# Patient Record
Sex: MALE | Employment: UNEMPLOYED | URBAN - METROPOLITAN AREA
[De-identification: names, ages, dates, MRNs, and addresses within clinical notes are randomized per-mention and may not be internally consistent; named-entity substitution may affect disease eponyms.]

---

## 2017-03-06 ENCOUNTER — OFFICE VISIT (OUTPATIENT)
Dept: INTERNAL MEDICINE CLINIC | Age: 42
End: 2017-03-06

## 2017-03-06 ENCOUNTER — HOSPITAL ENCOUNTER (OUTPATIENT)
Dept: LAB | Age: 42
Discharge: HOME | End: 2017-03-06

## 2017-03-06 VITALS
SYSTOLIC BLOOD PRESSURE: 130 MMHG | RESPIRATION RATE: 18 BRPM | HEIGHT: 71 IN | HEART RATE: 93 BPM | DIASTOLIC BLOOD PRESSURE: 80 MMHG | WEIGHT: 315 LBS | BODY MASS INDEX: 44.1 KG/M2 | OXYGEN SATURATION: 97 % | TEMPERATURE: 97.6 F

## 2017-03-06 DIAGNOSIS — Z23 NEED FOR TDAP VACCINATION: ICD-10-CM

## 2017-03-06 DIAGNOSIS — Z83.49 FAMILY HISTORY OF HYPOTHYROIDISM: ICD-10-CM

## 2017-03-06 DIAGNOSIS — I10 BENIGN HYPERTENSION WITHOUT CHF: Primary | ICD-10-CM

## 2017-03-06 DIAGNOSIS — R56.9 SEIZURE (HCC): ICD-10-CM

## 2017-03-06 DIAGNOSIS — Z13.21 ENCOUNTER FOR VITAMIN DEFICIENCY SCREENING: ICD-10-CM

## 2017-03-06 DIAGNOSIS — Z13.1 SCREENING FOR DIABETES MELLITUS (DM): ICD-10-CM

## 2017-03-06 DIAGNOSIS — F25.0 SCHIZOAFFECTIVE DISORDER, BIPOLAR TYPE (HCC): ICD-10-CM

## 2017-03-06 PROCEDURE — 99001 SPECIMEN HANDLING PT-LAB: CPT | Performed by: INTERNAL MEDICINE

## 2017-03-06 RX ORDER — LOSARTAN POTASSIUM 100 MG/1
100 TABLET ORAL DAILY
COMMUNITY
End: 2017-03-06 | Stop reason: SDUPTHER

## 2017-03-06 RX ORDER — DIAZEPAM 5 MG/1
5 TABLET ORAL 3 TIMES DAILY
COMMUNITY

## 2017-03-06 RX ORDER — ESCITALOPRAM OXALATE 20 MG/1
20 TABLET ORAL DAILY
COMMUNITY
End: 2017-05-03

## 2017-03-06 RX ORDER — TOPIRAMATE 100 MG/1
200 TABLET, FILM COATED ORAL DAILY
COMMUNITY
End: 2019-05-02

## 2017-03-06 RX ORDER — LOSARTAN POTASSIUM 100 MG/1
100 TABLET ORAL DAILY
Qty: 90 TAB | Refills: 3 | Status: SHIPPED | OUTPATIENT
Start: 2017-03-06 | End: 2018-03-16 | Stop reason: SDUPTHER

## 2017-03-06 RX ORDER — FLUPHENAZINE HYDROCHLORIDE 5 MG/1
12 TABLET ORAL 3 TIMES DAILY
COMMUNITY
End: 2019-05-02

## 2017-03-06 NOTE — PROGRESS NOTES
ROOM # 1    Frederick Noonan presents today for   Chief Complaint   Patient presents with    New Patient     establish care    Hypertension       Frederick Noonan preferred language for health care discussion is english/other. Is someone accompanying this pt? no    Is the patient using any DME equipment during OV? no    Depression Screening:  PHQ 2 / 9, over the last two weeks 3/6/2017   Little interest or pleasure in doing things Nearly every day   Feeling down, depressed or hopeless Nearly every day   Total Score PHQ 2 6       Learning Assessment:  Learning Assessment 3/6/2017   PRIMARY LEARNER Patient   HIGHEST LEVEL OF EDUCATION - PRIMARY LEARNER  2 YEARS OF COLLEGE   BARRIERS PRIMARY LEARNER COGNITIVE   CO-LEARNER CAREGIVER No   PRIMARY LANGUAGE ENGLISH   LEARNER PREFERENCE PRIMARY LISTENING     VIDEOS     READING   ANSWERED BY pateint   RELATIONSHIP SELF       Abuse Screening:  No flowsheet data found. Fall Risk  No flowsheet data found. Health Maintenance reviewed and discussed per provider. Yes    Frederick Noonan is due for [employer]. Please order/place referral if appropriate. Advance Directive:  1. Do you have an advance directive in place? Patient Reply: no    2. If not, would you like material regarding how to put one in place? Patient Reply: no    Coordination of Care:  1. Have you been to the ER, urgent care clinic since your last visit? Hospitalized since your last visit? no    2. Have you seen or consulted any other health care providers outside of the [address] [place] [address] since your last visit? Include any pap smears or colon screening.  no

## 2017-03-06 NOTE — MR AVS SNAPSHOT
Visit Information Date & Time Provider Department Dept. Phone Encounter #  
 3/6/2017  2:45 PM Darwin Wright MD [facility] [phone] [id number] Follow-up Instructions Return in about 1 month (around 4/6/2017) for f/u HTN, medicare wellness exam. Upcoming Health Maintenance Date Due DTaP/Tdap/Td series (2 - Td) 3/6/2027 Allergies as of 3/6/2017  Review Complete On: 3/6/2017 By: Kelsie Castañeda MD  
  
 Severity Noted Reaction Type Reactions Prozac [Fluoxetine]  03/06/2017    Other (comments) More suicidal  
 Ritalin [Methylphenidate]  04/12/2010    Nausea and Vomiting, Anxiety Current Immunizations  Reviewed on 3/6/2017 Name Date  
 TD Vaccine 4/12/2004 Tdap  Incomplete Reviewed by Kelsie Castañeda MD on 3/6/2017 at  2:43 PM  
You Were Diagnosed With   
  
 Codes Comments Benign hypertension without CHF    -  Primary ICD-10-CM: I10 
ICD-9-CM: 401.1 Schizoaffective disorder, bipolar type ([facility]     ICD-10-CM: F25.0 ICD-9-CM: 295.70 Encounter for vitamin deficiency screening     ICD-10-CM: Z13.21 ICD-9-CM: V77.99 Need for Tdap vaccination     ICD-10-CM: A93 ICD-9-CM: V06.1 Screening for diabetes mellitus (DM)     ICD-10-CM: Z13.1 ICD-9-CM: V77.1 Family history of hypothyroidism     ICD-10-CM: Z83.49 
ICD-9-CM: V18.19 Seizure ([facility]     ICD-10-CM: R56.9 ICD-9-CM: 780.39 Vitals BP Pulse Temp Resp Height(growth percentile) Weight(growth percentile) 130/80 (BP 1 Location: Left arm, BP Patient Position: Sitting) 93 97.6 °F (36.4 °C) (Oral) 18 5' 11\" (1.803 m) 316 lb (143.3 kg) SpO2 BMI Smoking Status 97% 44.07 kg/m2 Former Smoker Vitals History BMI and BSA Data Body Mass Index Body Surface Area 44.07 kg/m 2 2.68 m 2 Preferred Pharmacy Pharmacy Name Phone [address] [phone] Your Updated Medication List  
  
   
This list is accurate as of: 3/6/17  2:56 PM.  Always use your most recent med list.  
  
  
  
  
 fluPHENAZine 5 mg tablet Commonly known as:  PROLIXIN Take 5 mg by mouth two (2) times a day. LAMICTAL PO Take 100 mg by mouth two (2) times a day. LEXAPRO 20 mg tablet Generic drug:  escitalopram oxalate Take 20 mg by mouth daily. loratadine 10 mg tablet Commonly known as:  [place] Alexandra Take 1 Tab by mouth daily as needed for Allergies. losartan 100 mg tablet Commonly known as:  COZAAR Take 1 Tab by mouth daily. SEROQUEL PO Take 800 mg by mouth nightly. TOPAMAX 100 mg tablet Generic drug:  topiramate Take 100 mg by mouth two (2) times a day. VALIUM 5 mg tablet Generic drug:  diazePAM  
Take 5 mg by mouth three (3) times daily. Prescriptions Sent to Pharmacy Refills  
 losartan (COZAAR) 100 mg tablet 3 Sig: Take 1 Tab by mouth daily. Class: Normal  
 Pharmacy: [address] #: [phone] Route: Oral  
  
We Performed the Following REFERRAL TO NEUROLOGY [AUD69 Custom] Comments:  
 Please evaluate patient for seizures in 1 week. TETANUS, DIPHTHERIA TOXOIDS AND ACELLULAR PERTUSSIS VACCINE (TDAP), IN INDIVIDS. >=7, IM [id number] CPT(R)] Follow-up Instructions Return in about 1 month (around 4/6/2017) for f/u HTN, medicare wellness exam. To-Do List   
 03/06/2017 Lab:  CBC W/O DIFF   
  
 03/06/2017 Lab:  HEMOGLOBIN A1C WITH EAG   
  
 03/06/2017 Lab:  LIPID PANEL   
  
 03/06/2017 Lab:  METABOLIC PANEL, COMPREHENSIVE   
  
 03/06/2017 Lab:  TSH 3RD GENERATION   
  
 03/06/2017 Lab:  URINALYSIS W/ RFLX MICROSCOPIC   
  
 03/06/2017 Lab:  VITAMIN D, 25 HYDROXY Referral Information  Referral ID Referred By Referred To  
  
 [id number] [place], MD   
   [address] [facility] [address] [place] Phone: [phone] Fax: [phone] Visits Status Start Date End Date 1 New Request 3/6/17 3/6/18 If your referral has a status of pending review or denied, additional information will be sent to support the outcome of this decision. Patient Instructions 1) follow-up in 1 month or sooner if worsening symptoms. Introducing [facility]! Yelitza Shepherd introduces BitLit patient portal. Now you can access parts of your medical record, email your doctor's office, and request medication refills online. 1. In your internet browser, go to [email or web address] "[email or web address]/Last Sizet 2. Click on the First Time User? Click Here link in the Sign In box. You will see the New Member Sign Up page. 3. Enter your BitLit Access Code exactly as it appears below. You will not need to use this code after youve completed the sign-up process. If you do not sign up before the expiration date, you must request a new code. · BitLit Access Code: [facility] Expires: 6/4/2017  1:56 PM 
 
4. Enter the last four digits of your Social Security Number (xxxx) and Date of Birth (mm/dd/yyyy) as indicated and click Submit. You will be taken to the next sign-up page. 5. Create a BitLit ID. This will be your BitLit login ID and cannot be changed, so think of one that is secure and easy to remember. 6. Create a BitLit password. You can change your password at any time. 7. Enter your Password Reset Question and Answer. This can be used at a later time if you forget your password. 8. Enter your e-mail address. You will receive e-mail notification when new information is available in [address] 9. Click Sign Up. You can now view and download portions of your medical record. 10. Click the Download Summary menu link to download a portable copy of your medical information.  
 
If you have questions, please visit the Frequently Asked Questions section of the Certify website. Remember, Certify is NOT to be used for urgent needs. For medical emergencies, dial 911. Now available from your iPhone and Android! Please provide this summary of care documentation to your next provider. Your primary care clinician is listed as Darwin Montez. If you have any questions after today's visit, please call [phone].
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
## 2017-03-07 LAB
25(OH)D3+25(OH)D2 SERPL-MCNC: 18.3 NG/ML (ref 30–100)
ALBUMIN SERPL-MCNC: 4.9 G/DL (ref 3.5–5.5)
ALBUMIN/GLOB SERPL: 2 {RATIO} (ref 1.1–2.5)
ALP SERPL-CCNC: 136 IU/L (ref 39–117)
ALT SERPL-CCNC: 58 IU/L (ref 0–44)
APPEARANCE UR: CLEAR
AST SERPL-CCNC: 37 IU/L (ref 0–40)
BILIRUB SERPL-MCNC: 0.5 MG/DL (ref 0–1.2)
BILIRUB UR QL STRIP: NEGATIVE
BUN SERPL-MCNC: 6 MG/DL (ref 6–24)
BUN/CREAT SERPL: 7 (ref 9–20)
CALCIUM SERPL-MCNC: 9.6 MG/DL (ref 8.7–10.2)
CHLORIDE SERPL-SCNC: 99 MMOL/L (ref 96–106)
CHOLEST SERPL-MCNC: 218 MG/DL (ref 100–199)
CO2 SERPL-SCNC: 22 MMOL/L (ref 18–29)
COLOR UR: YELLOW
CREAT SERPL-MCNC: 0.86 MG/DL (ref 0.76–1.27)
ERYTHROCYTE [DISTWIDTH] IN BLOOD BY AUTOMATED COUNT: 14.9 % (ref 12.3–15.4)
EST. AVERAGE GLUCOSE BLD GHB EST-MCNC: 111 MG/DL
GLOBULIN SER CALC-MCNC: 2.4 G/DL (ref 1.5–4.5)
GLUCOSE SERPL-MCNC: 87 MG/DL (ref 65–99)
GLUCOSE UR QL: NEGATIVE
HBA1C MFR BLD: 5.5 % (ref 4.8–5.6)
HCT VFR BLD AUTO: 41.2 % (ref 37.5–51)
HDLC SERPL-MCNC: 33 MG/DL
HGB BLD-MCNC: 13.8 G/DL (ref 12.6–17.7)
HGB UR QL STRIP: NEGATIVE
INTERPRETATION, 910389: NORMAL
KETONES UR QL STRIP: NEGATIVE
LDLC SERPL CALC-MCNC: 139 MG/DL (ref 0–99)
LEUKOCYTE ESTERASE UR QL STRIP: NEGATIVE
MCH RBC QN AUTO: 27.1 PG (ref 26.6–33)
MCHC RBC AUTO-ENTMCNC: 33.5 G/DL (ref 31.5–35.7)
MCV RBC AUTO: 81 FL (ref 79–97)
MICRO URNS: ABNORMAL
NITRITE UR QL STRIP: NEGATIVE
PH UR STRIP: 6.5 [PH] (ref 5–7.5)
PLATELET # BLD AUTO: 372 X10E3/UL (ref 150–379)
POTASSIUM SERPL-SCNC: 4.2 MMOL/L (ref 3.5–5.2)
PROT SERPL-MCNC: 7.3 G/DL (ref 6–8.5)
PROT UR QL STRIP: NEGATIVE
RBC # BLD AUTO: 5.1 X10E6/UL (ref 4.14–5.8)
SODIUM SERPL-SCNC: 143 MMOL/L (ref 134–144)
SP GR UR: <=1.005 (ref 1–1.03)
TRIGL SERPL-MCNC: 230 MG/DL (ref 0–149)
TSH SERPL DL<=0.005 MIU/L-ACNC: 3.06 UIU/ML (ref 0.45–4.5)
UROBILINOGEN UR STRIP-MCNC: 0.2 MG/DL (ref 0.2–1)
VLDLC SERPL CALC-MCNC: 46 MG/DL (ref 5–40)
WBC # BLD AUTO: 6.4 X10E3/UL (ref 3.4–10.8)

## 2017-03-08 ENCOUNTER — TELEPHONE (OUTPATIENT)
Dept: INTERNAL MEDICINE CLINIC | Age: 42
End: 2017-03-08

## 2017-03-08 DIAGNOSIS — J30.9 ALLERGIC RHINITIS, UNSPECIFIED ALLERGIC RHINITIS TRIGGER, UNSPECIFIED RHINITIS SEASONALITY: Primary | ICD-10-CM

## 2017-03-08 DIAGNOSIS — E55.9 VITAMIN D DEFICIENCY: ICD-10-CM

## 2017-03-08 NOTE — TELEPHONE ENCOUNTER
----- Message from Toni Nicholson LPN sent at 0/6/1791  4:17 PM EST -----  Regarding: FW: Prescription Question  Contact: [phone]  Please advise  ----- Message -----     From: Virginia Butler     Sent: 3/7/2017   2:21 PM       To: [facility] Nurse Pool  Subject: Prescription Question                            I forgot to ask you if you could write a prescription for allergy medicine for me . If you could, I would like a 3 to 6 month supply at a time if possible.  Thank you very ahead of time, Tesfaye Lat

## 2017-03-08 NOTE — TELEPHONE ENCOUNTER
See below. Sent electronically:    Requested Prescriptions     Signed Prescriptions Disp Refills    loratadine (CLARITIN) 10 mg tablet 90 Tab 3     Sig: Take 1 Tab by mouth daily.      Authorizing Provider: Mateo Brown

## 2017-03-10 RX ORDER — LORATADINE 10 MG/1
10 TABLET ORAL DAILY
Qty: 90 TAB | Refills: 3 | Status: SHIPPED | OUTPATIENT
Start: 2017-03-10 | End: 2018-04-03 | Stop reason: SDUPTHER

## 2017-03-10 NOTE — TELEPHONE ENCOUNTER
Sent electronically:    Requested Prescriptions     Signed Prescriptions Disp Refills    loratadine (CLARITIN) 10 mg tablet 90 Tab 3     Sig: Take 1 Tab by mouth daily.      Authorizing Provider: Cooper Soto

## 2017-03-13 PROBLEM — E78.5 DYSLIPIDEMIA: Status: ACTIVE | Noted: 2017-03-01

## 2017-03-13 PROBLEM — E55.9 VITAMIN D DEFICIENCY: Status: ACTIVE | Noted: 2017-03-01

## 2017-03-13 RX ORDER — ERGOCALCIFEROL 1.25 MG/1
CAPSULE ORAL
Qty: 4 CAP | Refills: 0 | Status: SHIPPED | OUTPATIENT
Start: 2017-03-13 | End: 2017-05-03 | Stop reason: ALTCHOICE

## 2017-03-13 NOTE — PROGRESS NOTES
Please let pt know that labs were normal except:    1) lipids are high. He needs to work on diet and exercise. Mail low chol diet info to pt. 2) trig up, he needs to start fish oil 1000mg (DHA+EPA=1000mg) 2 po daily. Don't take at the same time as the lexapro. 3) vitamin D low. Will send electronically vitamin D2 50,000 international units  Take one po weekly x 4 weeks #4 no refills. Recheck vitamin D after 4 weeks. Order in [facility].

## 2017-03-14 ENCOUNTER — TELEPHONE (OUTPATIENT)
Dept: INTERNAL MEDICINE CLINIC | Age: 42
End: 2017-03-14

## 2017-03-14 NOTE — TELEPHONE ENCOUNTER
Attempted to contact pt at  number, no answer. Lvm for pt to return call to office at [phone]. Will continue to try to contact pt.

## 2017-03-14 NOTE — TELEPHONE ENCOUNTER
2 pt. Identifiers confirmed. Pt. Notified of below. No other questions/concerns at this time. Will mail low chol diet info.

## 2017-03-14 NOTE — TELEPHONE ENCOUNTER
----- Message from Kelsie Castañeda MD sent at 3/13/2017  1:51 PM EDT -----  Please let pt know that labs were normal except:    1) lipids are high. He needs to work on diet and exercise. Mail low chol diet info to pt. 2) trig up, he needs to start fish oil 1000mg (DHA+EPA=1000mg) 2 po daily. Don't take at the same time as the lexapro. 3) vitamin D low. Will send electronically vitamin D2 50,000 international units  Take one po weekly x 4 weeks #4 no refills. Recheck vitamin D after 4 weeks. Order in [facility].

## 2017-04-13 DIAGNOSIS — E55.9 VITAMIN D DEFICIENCY: ICD-10-CM

## 2017-05-03 ENCOUNTER — HOSPITAL ENCOUNTER (OUTPATIENT)
Dept: LAB | Age: 42
Discharge: HOME | End: 2017-05-03

## 2017-05-03 ENCOUNTER — OFFICE VISIT (OUTPATIENT)
Dept: INTERNAL MEDICINE CLINIC | Age: 42
End: 2017-05-03

## 2017-05-03 VITALS
BODY MASS INDEX: 44.1 KG/M2 | WEIGHT: 315 LBS | DIASTOLIC BLOOD PRESSURE: 88 MMHG | SYSTOLIC BLOOD PRESSURE: 120 MMHG | RESPIRATION RATE: 16 BRPM | OXYGEN SATURATION: 97 % | HEART RATE: 96 BPM | TEMPERATURE: 96.8 F | HEIGHT: 71 IN

## 2017-05-03 DIAGNOSIS — I10 BENIGN HYPERTENSION WITHOUT CHF: Primary | ICD-10-CM

## 2017-05-03 DIAGNOSIS — E55.9 VITAMIN D DEFICIENCY: ICD-10-CM

## 2017-05-03 DIAGNOSIS — E78.5 DYSLIPIDEMIA: ICD-10-CM

## 2017-05-03 PROCEDURE — 99001 SPECIMEN HANDLING PT-LAB: CPT | Performed by: INTERNAL MEDICINE

## 2017-05-03 NOTE — PROGRESS NOTES
ROOM # 1    Bethany Green presents today for   Chief Complaint   Patient presents with    Hypertension     f/u       Bethany Green preferred language for health care discussion is english/other. Is someone accompanying this pt? no    Is the patient using any DME equipment during OV? no    Depression Screening:  PHQ over the last two weeks 3/6/2017   PHQ Not Done Active Diagnosis of Depression or Bipolar Disorder   Little interest or pleasure in doing things Nearly every day   Feeling down, depressed or hopeless Nearly every day   Total Score PHQ 2 6       Learning Assessment:  Learning Assessment 3/6/2017   PRIMARY LEARNER Patient   HIGHEST LEVEL OF EDUCATION - PRIMARY LEARNER  2 YEARS OF COLLEGE   BARRIERS PRIMARY LEARNER COGNITIVE   CO-LEARNER CAREGIVER No   PRIMARY LANGUAGE ENGLISH   LEARNER PREFERENCE PRIMARY LISTENING     VIDEOS     READING   ANSWERED BY pateint   RELATIONSHIP SELF       Abuse Screening:  No flowsheet data found. Fall Risk  No flowsheet data found. Health Maintenance reviewed and discussed per provider. Yes      Advance Directive:  1. Do you have an advance directive in place? Patient Reply: no    2. If not, would you like material regarding how to put one in place? Patient Reply: no    Coordination of Care:  1. Have you been to the ER, urgent care clinic since your last visit? Hospitalized since your last visit? no    2. Have you seen or consulted any other health care providers outside of the [address] [place] [address] since your last visit? Include any pap smears or colon screening.  psych

## 2017-05-03 NOTE — MR AVS SNAPSHOT
Visit Information Date & Time Provider Department Dept. Phone Encounter #  
 5/3/2017 10:30 AM Gabriela Mike MD [address] [phone] [id number] Follow-up Instructions Return in about 3 months (around 8/3/2017) for f/u HTN/lipids. Upcoming Health Maintenance Date Due INFLUENZA AGE 9 TO ADULT 8/1/2017 DTaP/Tdap/Td series (2 - Td) 3/6/2027 Allergies as of 5/3/2017  Review Complete On: 5/3/2017 By: Gabriela Mike MD  
  
 Severity Noted Reaction Type Reactions Prozac [Fluoxetine]  03/06/2017    Other (comments) More suicidal  
 Ritalin [Methylphenidate]  04/12/2010    Nausea and Vomiting, Anxiety Current Immunizations  Reviewed on 3/6/2017 Name Date  
 TD Vaccine 4/12/2004 Tdap 3/6/2017 Not reviewed this visit You Were Diagnosed With   
  
 Codes Comments Benign hypertension without CHF    -  Primary ICD-10-CM: I10 
ICD-9-CM: 401.1 Dyslipidemia     ICD-10-CM: E78.5 ICD-9-CM: 272.4 Vitamin D deficiency     ICD-10-CM: E55.9 ICD-9-CM: 268.9 Vitals BP Pulse Temp Resp Height(growth percentile) Weight(growth percentile) 120/88 (BP 1 Location: Left arm, BP Patient Position: Sitting) 96 96.8 °F (36 °C) (Oral) 16 5' 11\" (1.803 m) 316 lb 9.6 oz (143.6 kg) SpO2 BMI Smoking Status 97% 44.16 kg/m2 Former Smoker Vitals History BMI and BSA Data Body Mass Index Body Surface Area  
 44.16 kg/m 2 2.68 m 2 [facility] [phone] Your Updated Medication List  
  
   
This list is accurate as of: 5/3/17 10:50 AM.  Always use your most recent med list.  
  
  
  
  
 fluPHENAZine 5 mg tablet Commonly known as:  PROLIXIN Take 5 mg by mouth two (2) times a day. LAMICTAL PO Take 100 mg by mouth two (2) times a day. loratadine 10 mg tablet Commonly known as: [place] Bryson Take 1 Tab by mouth daily. losartan 100 mg tablet Commonly known as:  COZAAR Take 1 Tab by mouth daily. OTHER Fish oil 2400mg a daily liquid SEROQUEL PO Take 800 mg by mouth nightly. TOPAMAX 100 mg tablet Generic drug:  topiramate Take 100 mg by mouth two (2) times a day. VALIUM 5 mg tablet Generic drug:  diazePAM  
Take 10 mg by mouth three (3) times daily. Follow-up Instructions Return in about 3 months (around 8/3/2017) for f/u HTN/lipids. To-Do List   
 05/03/2017 Lab:  VITAMIN D, 25 HYDROXY Patient Instructions 1) follow-up in 3 months or sooner if worsening symptoms. Introducing [facility]! Dear Milind Forte: 
Thank you for requesting a Roov[employer] account. Our records indicate that you already have an active Roovyn account. You can access your account anytime at [email or web address] Did you know that you can access your hospital and ER discharge instructions at any time in Roovyn? You can also review all of your test results from your hospital stay or ER visit. Additional Information If you have questions, please visit the Frequently Asked Questions section of the Roovyn website at [email or web address] Remember, Roovyn is NOT to be used for urgent needs. For medical emergencies, dial 911. Now available from your iPhone and Android! Please provide this summary of care documentation to your next provider. Your primary care clinician is listed as Darwin Montez. If you have any questions after today's visit, please call [phone].
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
## 2017-05-04 LAB — 25(OH)D3+25(OH)D2 SERPL-MCNC: 22.3 NG/ML (ref 30–100)

## 2017-05-11 DIAGNOSIS — J30.9 ALLERGIC RHINITIS, UNSPECIFIED ALLERGIC RHINITIS TRIGGER, UNSPECIFIED RHINITIS SEASONALITY: ICD-10-CM

## 2017-05-11 RX ORDER — LORATADINE 10 MG/1
10 TABLET ORAL DAILY
Qty: 90 TAB | Refills: 3 | OUTPATIENT
Start: 2017-05-11

## 2017-05-11 NOTE — TELEPHONE ENCOUNTER
From: Wilner Crawley  To: Viet Putnam MD  Sent: 5/11/2017 7:05 AM EDT  Subject: Medication Renewal Request    Original authorizing provider: MD Wilner Baum would like a refill of the following medications:  loratadine (CLARITIN) 10 mg tablet Viet Putnam MD]    Preferred pharmacy: [place] [id number]    Comment:

## 2017-05-11 NOTE — TELEPHONE ENCOUNTER
Spoke with patient verified with two identifiers and he stated he called the pharmacy for his refill and he stated that they were giving him only a 30 day supply so he stated that they told him to bring up his old bottles and and they are going to fix it for the 90 day with refills. Advised patient that if they do not fix it then to send a request again via my chart with that not that they did not fix his refills from 3/2017. Patient verbalized understanding.

## 2017-06-07 ENCOUNTER — OFFICE VISIT (OUTPATIENT)
Dept: INTERNAL MEDICINE CLINIC | Age: 42
End: 2017-06-07

## 2017-06-07 VITALS
TEMPERATURE: 96.3 F | DIASTOLIC BLOOD PRESSURE: 80 MMHG | SYSTOLIC BLOOD PRESSURE: 140 MMHG | HEART RATE: 90 BPM | HEIGHT: 71 IN | RESPIRATION RATE: 18 BRPM | BODY MASS INDEX: 44.1 KG/M2 | WEIGHT: 315 LBS | OXYGEN SATURATION: 96 %

## 2017-06-07 DIAGNOSIS — R13.10 DYSPHAGIA, UNSPECIFIED TYPE: Primary | ICD-10-CM
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
## 2017-06-07 NOTE — PROGRESS NOTES
ROOM # Sandy Vásquez 86 presents today for   Chief Complaint   Patient presents with    Vomiting     not nauseous, but vomits when food touches throat.  Dysphagia     food stuck in throat       Xavi Brizuela preferred language for health care discussion is english/other. Is someone accompanying this pt? no    Is the patient using any DME equipment during OV? no    Depression Screening:  PHQ over the last two weeks 3/6/2017   PHQ Not Done Active Diagnosis of Depression or Bipolar Disorder   Little interest or pleasure in doing things Nearly every day   Feeling down, depressed or hopeless Nearly every day   Total Score PHQ 2 6       Learning Assessment:  Learning Assessment 3/6/2017   PRIMARY LEARNER Patient   HIGHEST LEVEL OF EDUCATION - PRIMARY LEARNER  2 YEARS OF COLLEGE   BARRIERS PRIMARY LEARNER COGNITIVE   CO-LEARNER CAREGIVER No   PRIMARY LANGUAGE ENGLISH   LEARNER PREFERENCE PRIMARY LISTENING     VIDEOS     READING   ANSWERED BY pateint   RELATIONSHIP SELF       Abuse Screening:  No flowsheet data found. Fall Risk  No flowsheet data found. Health Maintenance reviewed and discussed per provider. Yes      Advance Directive:  1. Do you have an advance directive in place? Patient Reply: no    2. If not, would you like material regarding how to put one in place? Patient Reply: no    Coordination of Care:  1. Have you been to the ER, urgent care clinic since your last visit? Hospitalized since your last visit? no    2. Have you seen or consulted any other health care providers outside of the [address] [place] [address] since your last visit? Include any pap smears or colon screening.  no

## 2017-06-07 NOTE — MR AVS SNAPSHOT
Visit Information Date & Time Provider Department Dept. Phone Encounter #  
 6/7/2017  9:15 AM Jazmin Flores MD [facility] [phone] [id number] Follow-up Instructions Return in about 8 weeks (around 8/3/2017) for pt already has this appt. scheduled. .  
  
Your Appointments 8/3/2017 10:30 AM  
Office Visit with Jr Ernst MD  
[facility]) Appt Note: 3 month f/u HTN/Lipids; MWV and f/u htn and chol  
 [address]  
  
   
 [address] [facility] Upcoming Health Maintenance Date Due INFLUENZA AGE 9 TO ADULT 8/1/2017 DTaP/Tdap/Td series (2 - Td) 3/6/2027 Allergies as of 6/7/2017  Review Complete On: 6/7/2017 By: Jr Ernst MD  
  
 Severity Noted Reaction Type Reactions Prozac [Fluoxetine]  03/06/2017    Other (comments) More suicidal  
 Ritalin [Methylphenidate]  04/12/2010    Nausea and Vomiting, Anxiety Current Immunizations  Reviewed on 3/6/2017 Name Date  
 TD Vaccine 4/12/2004 Tdap 3/6/2017 Not reviewed this visit You Were Diagnosed With   
  
 Codes Comments Dysphagia, unspecified type    -  Primary ICD-10-CM: R13.10 ICD-9-CM: 787.20 Vitals BP Pulse Temp Resp Height(growth percentile) Weight(growth percentile) 140/80 (BP 1 Location: Left arm, BP Patient Position: Sitting) 90 96.3 °F (35.7 °C) (Oral) 18 5' 11\" (1.803 m) 320 lb 9.6 oz (145.4 kg) SpO2 BMI Smoking Status 96% 44.71 kg/m2 Former Smoker Vitals History BMI and BSA Data Body Mass Index Body Surface Area 44.71 kg/m 2 2.7 m 2 Preferred Pharmacy Pharmacy Name Phone [facility] [phone] Your Updated Medication List  
  
   
This list is accurate as of: 6/7/17  9:18 AM.  Always use your most recent med list.  
  
  
  
  
 fluPHENAZine 5 mg tablet Commonly known as:  PROLIXIN Take 5 mg by mouth two (2) times a day. LAMICTAL PO Take 100 mg by mouth two (2) times a day. loratadine 10 mg tablet Commonly known as:  Brittani Pound Take 1 Tab by mouth daily. losartan 100 mg tablet Commonly known as:  COZAAR Take 1 Tab by mouth daily. OTHER Fish oil 2400mg a daily liquid SEROQUEL PO Take 800 mg by mouth nightly. TOPAMAX 100 mg tablet Generic drug:  topiramate Take 100 mg by mouth two (2) times a day. VALIUM 5 mg tablet Generic drug:  diazePAM  
Take 10 mg by mouth three (3) times daily. VITAMIN D3 PO Take 200 Units by mouth daily. We Performed the Following REFERRAL TO GASTROENTEROLOGY [IZT04 Custom] Comments:  
 Please evaluate patient for dysphagia ASAP. Follow-up Instructions Return in about 8 weeks (around 8/3/2017) for pt already has this appt. scheduled. .  
  
  
Referral Information Referral ID Referred By Referred To  
  
 [id number] Susi Carrillo MD   
   [address] [place] [address] Phone: [phone] Fax: [phone] Visits Status Start Date End Date 1 New Request 6/7/17 6/7/18 If your referral has a status of pending review or denied, additional information will be sent to support the outcome of this decision. Patient Instructions 1) follow-up on 8/3/17 or sooner if worsening symptoms. Introducing [facility]! Dear Justin Chappell: 
Thank you for requesting a [employer] account. Our records indicate that you already have an active [employer] account. You can access your account anytime at [email or web address] Did you know that you can access your hospital and ER discharge instructions at any time in [employer]? You can also review all of your test results from your hospital stay or ER visit. Additional Information If you have questions, please visit the Frequently Asked Questions section of the [email or web address] website at [email or web address] Remember, [employer] is NOT to be used for urgent needs. For medical emergencies, dial 911. Now available from your iPhone and Android! Please provide this summary of care documentation to your next provider. Your primary care clinician is listed as Darwin Montez. If you have any questions after today's visit, please call [phone].

## 2017-10-04 ENCOUNTER — TELEPHONE (OUTPATIENT)
Dept: INTERNAL MEDICINE CLINIC | Age: 42
End: 2017-10-04

## 2017-10-04 NOTE — LETTER
10/5/2017 6:59 AM 
 
Mr. [address] [facility] [id number] To Whom It May Concern: This is a letter stating that Mr. Tim Rodas, : 1975. I am his primary care physician and he requires a blood pressure monitor for his hypertension. If you have any questions, please have the patient call us at [phone]. Sincerely, Alex Heath M.D.

## 2017-10-04 NOTE — TELEPHONE ENCOUNTER
Patient called and said he needs a letter stating the need for a blood pressure machine. He spoke with his insurance company and he said they would need a letter stating this. The fax number is [phone] per patient. Patient's new number is [phone].

## 2018-01-29 ENCOUNTER — OFFICE VISIT (OUTPATIENT)
Dept: INTERNAL MEDICINE CLINIC | Age: 43
End: 2018-01-29

## 2018-01-29 ENCOUNTER — HOSPITAL ENCOUNTER (OUTPATIENT)
Dept: LAB | Age: 43
Discharge: HOME | End: 2018-01-29

## 2018-01-29 VITALS
OXYGEN SATURATION: 95 % | SYSTOLIC BLOOD PRESSURE: 129 MMHG | TEMPERATURE: 98.3 F | HEART RATE: 77 BPM | DIASTOLIC BLOOD PRESSURE: 93 MMHG | WEIGHT: 298 LBS | BODY MASS INDEX: 41.72 KG/M2 | RESPIRATION RATE: 18 BRPM | HEIGHT: 71 IN

## 2018-01-29 DIAGNOSIS — E66.01 MORBID OBESITY WITH BMI OF 40.0-44.9, ADULT (HCC): Chronic | ICD-10-CM

## 2018-01-29 DIAGNOSIS — I10 ESSENTIAL HYPERTENSION: Chronic | ICD-10-CM

## 2018-01-29 DIAGNOSIS — E78.5 DYSLIPIDEMIA: Primary | ICD-10-CM

## 2018-01-29 DIAGNOSIS — E55.9 VITAMIN D DEFICIENCY: ICD-10-CM

## 2018-01-29 PROCEDURE — 99001 SPECIMEN HANDLING PT-LAB: CPT | Performed by: FAMILY MEDICINE
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
## 2018-01-29 NOTE — PROGRESS NOTES
Kimani Acosta is a 40 y.o. male  Chief Complaint   Patient presents with    Physical     1. Have you been to the ER, urgent care clinic since your last visit? Hospitalized since your last visit? No    2. Have you seen or consulted any other health care providers outside of the [address] [place] [address] since your last visit? Include any pap smears or colon screening.  No

## 2018-01-29 NOTE — MR AVS SNAPSHOT
[address] [place] [address] 
 
 
 [address] [facility] [id number] 
[phone] Patient: Tito Orr MRN: [id number] :1975 Visit Information Date & Time Provider Department Dept. Phone Encounter #  
 2018 11:15 AM Lawanda GuerreroReedsy [phone] [id number] Upcoming Health Maintenance Date Due Influenza Age 5 to Adult 2017 DTaP/Tdap/Td series (2 - Td) 3/6/2027 Allergies as of 2018  Review Complete On: 2018 By: Opal Cuadra LPN Severity Noted Reaction Type Reactions Prozac [Fluoxetine]  2017    Other (comments) More suicidal  
 Ritalin [Methylphenidate]  2010    Nausea and Vomiting, Anxiety Current Immunizations  Reviewed on 3/6/2017 Name Date  
 TD Vaccine 2004 Tdap 3/6/2017 Not reviewed this visit You Were Diagnosed With   
  
 Codes Comments Dyslipidemia    -  Primary ICD-10-CM: E78.5 ICD-9-CM: 272.4 Vitamin D deficiency     ICD-10-CM: E55.9 ICD-9-CM: 268.9 Morbid obesity with BMI of 40.0-44.9, adult (HCC)     ICD-10-CM: E66.01, Z68.41 
ICD-9-CM: 278.01, V85.41 Essential hypertension     ICD-10-CM: I10 
ICD-9-CM: 401.9 Vitals BP Pulse Temp Resp Height(growth percentile) Weight(growth percentile) (!) 129/93 (BP 1 Location: Right arm, BP Patient Position: Sitting) 77 98.3 °F (36.8 °C) (Oral) 18 5' 11\" (1.803 m) 298 lb (135.2 kg) SpO2 BMI Smoking Status 95% 41.56 kg/m2 Former Smoker Vitals History BMI and BSA Data Body Mass Index Body Surface Area 41.56 kg/m 2 2.6 m 2 Preferred Pharmacy Pharmacy Name Phone [facility] [phone] Your Updated Medication List  
  
   
This list is accurate as of: 18 12:19 PM.  Always use your most recent med list.  
  
  
  
  
 fluPHENAZine 5 mg tablet Commonly known as:  PROLIXIN Take 5 mg by mouth two (2) times a day. LAMICTAL PO Take 100 mg by mouth two (2) times a day. loratadine 10 mg tablet Commonly known as:  Love Rosales Take 1 Tab by mouth daily. losartan 100 mg tablet Commonly known as:  COZAAR Take 1 Tab by mouth daily. OTHER Fish oil 2400mg a daily liquid SEROQUEL PO Take 800 mg by mouth nightly. TOPAMAX 100 mg tablet Generic drug:  topiramate Take 100 mg by mouth two (2) times a day. VALIUM 5 mg tablet Generic drug:  diazePAM  
Take 10 mg by mouth three (3) times daily. VITAMIN D3 PO Take 200 Units by mouth daily. To-Do List   
 01/29/2018 Lab:  CBC WITH AUTOMATED DIFF   
  
 01/29/2018 Lab:  HEMOGLOBIN A1C WITH EAG   
  
 01/29/2018 Lab:  LDL, DIRECT   
  
 01/29/2018 Lab:  METABOLIC PANEL, COMPREHENSIVE   
  
 01/29/2018 Lab:  TSH 3RD GENERATION   
  
 01/29/2018 Lab:  VITAMIN D, 1, 25 DIHYDROXY Introducing [facility] SERVICES! Dear Zulema Hernandez: 
Thank you for requesting a [employer] account. Our records indicate that you already have an active [employer] account. You can access your account anytime at [email or web address] Did you know that you can access your hospital and ER discharge instructions at any time in [employer]? You can also review all of your test results from your hospital stay or ER visit. Additional Information If you have questions, please visit the Frequently Asked Questions section of the [employer] website at [email or web address] Remember, [employer] is NOT to be used for urgent needs. For medical emergencies, dial 911. Now available from your iPhone and Android! Please provide this summary of care documentation to your next provider. Your primary care clinician is listed as Darwin Montez. If you have any questions after today's visit, please call [phone].

## 2018-01-30 LAB
1,25(OH)2D3 SERPL-MCNC: 64.7 PG/ML (ref 19.9–79.3)
ALBUMIN SERPL-MCNC: 4.7 G/DL (ref 3.5–5.5)
ALBUMIN/GLOB SERPL: 1.7 {RATIO} (ref 1.2–2.2)
ALP SERPL-CCNC: 119 IU/L (ref 39–117)
ALT SERPL-CCNC: 41 IU/L (ref 0–44)
AST SERPL-CCNC: 28 IU/L (ref 0–40)
BASOPHILS # BLD AUTO: 0.1 X10E3/UL (ref 0–0.2)
BASOPHILS NFR BLD AUTO: 1 %
BILIRUB SERPL-MCNC: 0.9 MG/DL (ref 0–1.2)
BUN SERPL-MCNC: 8 MG/DL (ref 6–24)
BUN/CREAT SERPL: 9 (ref 9–20)
CALCIUM SERPL-MCNC: 9.7 MG/DL (ref 8.7–10.2)
CHLORIDE SERPL-SCNC: 98 MMOL/L (ref 96–106)
CO2 SERPL-SCNC: 23 MMOL/L (ref 18–29)
CREAT SERPL-MCNC: 0.89 MG/DL (ref 0.76–1.27)
EOSINOPHIL # BLD AUTO: 0.2 X10E3/UL (ref 0–0.4)
EOSINOPHIL NFR BLD AUTO: 2 %
ERYTHROCYTE [DISTWIDTH] IN BLOOD BY AUTOMATED COUNT: 13.3 % (ref 12.3–15.4)
EST. AVERAGE GLUCOSE BLD GHB EST-MCNC: 103 MG/DL
GFR SERPLBLD CREATININE-BSD FMLA CKD-EPI: 106 ML/MIN/1.73
GFR SERPLBLD CREATININE-BSD FMLA CKD-EPI: 123 ML/MIN/1.73
GLOBULIN SER CALC-MCNC: 2.7 G/DL (ref 1.5–4.5)
GLUCOSE SERPL-MCNC: 92 MG/DL (ref 65–99)
HBA1C MFR BLD: 5.2 % (ref 4.8–5.6)
HCT VFR BLD AUTO: 42.4 % (ref 37.5–51)
HGB BLD-MCNC: 14.8 G/DL (ref 13–17.7)
IMM GRANULOCYTES # BLD: 0 X10E3/UL (ref 0–0.1)
IMM GRANULOCYTES NFR BLD: 0 %
LDLC SERPL DIRECT ASSAY-MCNC: 152 MG/DL (ref 0–99)
LYMPHOCYTES # BLD AUTO: 1.6 X10E3/UL (ref 0.7–3.1)
LYMPHOCYTES NFR BLD AUTO: 21 %
MCH RBC QN AUTO: 29.3 PG (ref 26.6–33)
MCHC RBC AUTO-ENTMCNC: 34.9 G/DL (ref 31.5–35.7)
MCV RBC AUTO: 84 FL (ref 79–97)
MONOCYTES # BLD AUTO: 0.4 X10E3/UL (ref 0.1–0.9)
MONOCYTES NFR BLD AUTO: 6 %
NEUTROPHILS # BLD AUTO: 5.4 X10E3/UL (ref 1.4–7)
NEUTROPHILS NFR BLD AUTO: 70 %
PLATELET # BLD AUTO: 362 X10E3/UL (ref 150–379)
POTASSIUM SERPL-SCNC: 4.3 MMOL/L (ref 3.5–5.2)
PROT SERPL-MCNC: 7.4 G/DL (ref 6–8.5)
RBC # BLD AUTO: 5.05 X10E6/UL (ref 4.14–5.8)
SODIUM SERPL-SCNC: 138 MMOL/L (ref 134–144)
TSH SERPL DL<=0.005 MIU/L-ACNC: 3.19 UIU/ML (ref 0.45–4.5)
WBC # BLD AUTO: 7.7 X10E3/UL (ref 3.4–10.8)

## 2018-02-02 ENCOUNTER — TELEPHONE (OUTPATIENT)
Dept: INTERNAL MEDICINE CLINIC | Age: 43
End: 2018-02-02

## 2018-02-02 NOTE — TELEPHONE ENCOUNTER
----- Message from Christina Robertson sent at 2/2/2018  7:51 AM EST -----  Regarding: FW: Test Results Question  Contact: [phone]      ----- Message -----     From: Jose Coleman     Sent: 2/2/2018   7:21 AM       To: [facility] Nurse Pool  Subject: Test Results Question                            I would like to know if there is anything I neeed to be concerned with over all my test results. I would also like if you could refer me for a neurologist for my ticks. Preferably at [facility] if possible. I also need to know when would be a good time to set up and see you again.

## 2018-02-02 NOTE — TELEPHONE ENCOUNTER
I did not order the labs. It looks like it was Dr. Louis Carmona. However, labs were okay except LDL was high at 152. Can we have the lab add on lipid panel? Additional dx code: E78.5. Depending on the further lipid panel results, will determine your next OV appt. What type of ticks is he having? Did he ever see Dr. Mauri Acevedo for his seizures last year? I referred him on  3/6/17.

## 2018-02-07 ENCOUNTER — TELEPHONE (OUTPATIENT)
Dept: INTERNAL MEDICINE CLINIC | Age: 43
End: 2018-02-07

## 2018-02-07 NOTE — TELEPHONE ENCOUNTER
Unless he his having symptoms, not going to do EKG. If he is having symptoms, then he needs to be seen.

## 2018-02-07 NOTE — TELEPHONE ENCOUNTER
Patient called to request EKG. Patient did not relay his concerns as to why he felt he needed testing.

## 2018-02-08 NOTE — TELEPHONE ENCOUNTER
2 pt. Identifiers confirmed. Pt. Notified of below. Per pt. He has some numbness in left arm for 1 month and chest hurts a little sometimes. He was advised to be seen in the ER if having s/s now. He states it takes 3 days for cab to provide transportation d/t insurance. Pt. Will s/w his mother to decide whether to go to the ED tonight or to come in to the clinic to be seen tomorrow. No other questions/concerns at this time.

## 2018-03-24 DIAGNOSIS — I10 BENIGN HYPERTENSION WITHOUT CHF: ICD-10-CM

## 2018-03-25 RX ORDER — LOSARTAN POTASSIUM 100 MG/1
TABLET ORAL
Qty: 90 TAB | Refills: 0 | Status: SHIPPED | OUTPATIENT
Start: 2018-03-25 | End: 2018-08-20 | Stop reason: SDUPTHER

## 2018-03-29 ENCOUNTER — TELEPHONE (OUTPATIENT)
Dept: INTERNAL MEDICINE CLINIC | Age: 43
End: 2018-03-29

## 2018-03-29 DIAGNOSIS — Z86.59 H/O TICS: ICD-10-CM

## 2018-03-29 DIAGNOSIS — R56.9 SEIZURE (HCC): Primary | ICD-10-CM

## 2018-03-29 NOTE — TELEPHONE ENCOUNTER
Referral generated for  Dr. Angel Liu. Contact information below. Please have pt call them if does not have an appt. Within 1 week.      [address] [place], [address] [facility]  Phone: [phone]

## 2018-03-29 NOTE — TELEPHONE ENCOUNTER
Patient requesting referral for Neurology for seizures and facial tic. Patient requests specialist be in the area of [facility].

## 2018-04-02 ENCOUNTER — TELEPHONE (OUTPATIENT)
Dept: INTERNAL MEDICINE CLINIC | Age: 43
End: 2018-04-02

## 2018-04-02 DIAGNOSIS — I10 BENIGN HYPERTENSION WITHOUT CHF: ICD-10-CM

## 2018-04-02 DIAGNOSIS — E78.5 DYSLIPIDEMIA: Primary | ICD-10-CM

## 2018-04-02 DIAGNOSIS — E55.9 VITAMIN D DEFICIENCY: ICD-10-CM

## 2018-04-02 NOTE — TELEPHONE ENCOUNTER
2 pt. Identifiers confirmed. Pt. Notified that records have been sent in regards to below. Confirmation received. Pt. Also scheduled for chol f/u 4/10/18 and he requests that labs be entered so that he may complete them prior to aptmt. No other questions/concerns at this time.

## 2018-04-02 NOTE — TELEPHONE ENCOUNTER
Patient states he contact Neurology to make appointment. Dr Christopher Pruitt office requires Demographics, most recent lab and office visits prior to scheduling.

## 2018-04-03 RX ORDER — LORATADINE 10 MG/1
10 TABLET ORAL DAILY
Qty: 90 TAB | Refills: 3 | Status: SHIPPED | OUTPATIENT
Start: 2018-04-03 | End: 2019-05-04 | Stop reason: SDUPTHER

## 2018-04-03 NOTE — TELEPHONE ENCOUNTER
Requested Prescriptions     Pending Prescriptions Disp Refills    loratadine (CLARITIN) 10 mg tablet 90 Tab 3     Sig: Take 1 Tab by mouth daily.

## 2018-05-02 DIAGNOSIS — E78.5 DYSLIPIDEMIA: ICD-10-CM

## 2018-05-02 DIAGNOSIS — E55.9 VITAMIN D DEFICIENCY: ICD-10-CM

## 2018-06-27 ENCOUNTER — OFFICE VISIT (OUTPATIENT)
Dept: INTERNAL MEDICINE CLINIC | Age: 43
End: 2018-06-27

## 2018-06-27 ENCOUNTER — TELEPHONE (OUTPATIENT)
Dept: INTERNAL MEDICINE CLINIC | Age: 43
End: 2018-06-27

## 2018-06-27 ENCOUNTER — HOSPITAL ENCOUNTER (OUTPATIENT)
Dept: LAB | Age: 43
Discharge: HOME | End: 2018-06-27

## 2018-06-27 VITALS
HEIGHT: 71 IN | DIASTOLIC BLOOD PRESSURE: 80 MMHG | TEMPERATURE: 96.7 F | HEART RATE: 86 BPM | WEIGHT: 315 LBS | SYSTOLIC BLOOD PRESSURE: 132 MMHG | RESPIRATION RATE: 16 BRPM | OXYGEN SATURATION: 96 % | BODY MASS INDEX: 44.1 KG/M2

## 2018-06-27 DIAGNOSIS — G89.29 CHRONIC BILATERAL LOW BACK PAIN WITHOUT SCIATICA: ICD-10-CM

## 2018-06-27 DIAGNOSIS — E66.01 MORBID OBESITY WITH BMI OF 40.0-44.9, ADULT (HCC): Chronic | ICD-10-CM

## 2018-06-27 DIAGNOSIS — T88.7XXA SIDE EFFECT OF MEDICATION: ICD-10-CM

## 2018-06-27 DIAGNOSIS — I10 BENIGN HYPERTENSION WITHOUT CHF: ICD-10-CM

## 2018-06-27 DIAGNOSIS — E55.9 VITAMIN D DEFICIENCY: ICD-10-CM

## 2018-06-27 DIAGNOSIS — M54.50 CHRONIC BILATERAL LOW BACK PAIN WITHOUT SCIATICA: ICD-10-CM

## 2018-06-27 DIAGNOSIS — E78.5 DYSLIPIDEMIA: ICD-10-CM

## 2018-06-27 DIAGNOSIS — Z00.00 ENCOUNTER FOR MEDICARE ANNUAL WELLNESS EXAM: Primary | ICD-10-CM

## 2018-06-27 DIAGNOSIS — E66.9 OBESITY WITHOUT SERIOUS COMORBIDITY, UNSPECIFIED CLASSIFICATION, UNSPECIFIED OBESITY TYPE: ICD-10-CM

## 2018-06-27 DIAGNOSIS — R11.2 NAUSEA AND VOMITING, INTRACTABILITY OF VOMITING NOT SPECIFIED, UNSPECIFIED VOMITING TYPE: ICD-10-CM

## 2018-06-27 PROBLEM — F32.1 MODERATE MAJOR DEPRESSION (HCC): Status: ACTIVE | Noted: 2018-06-27

## 2018-06-27 PROCEDURE — 99001 SPECIMEN HANDLING PT-LAB: CPT | Performed by: INTERNAL MEDICINE

## 2018-06-27 RX ORDER — GABAPENTIN 600 MG/1
600 TABLET ORAL
Refills: 0 | COMMUNITY
Start: 2018-06-12 | End: 2019-05-02

## 2018-06-27 RX ORDER — ONDANSETRON 4 MG/1
4 TABLET, ORALLY DISINTEGRATING ORAL
Qty: 30 TAB | Refills: 0 | Status: SHIPPED | OUTPATIENT
Start: 2018-06-27 | End: 2018-07-12 | Stop reason: SDUPTHER

## 2018-06-27 RX ORDER — PERPHENAZINE 8 MG/1
16 TABLET, FILM COATED ORAL 3 TIMES DAILY
Refills: 0 | COMMUNITY
Start: 2018-06-08 | End: 2019-05-02

## 2018-06-27 RX ORDER — MELOXICAM 7.5 MG/1
TABLET ORAL
Qty: 60 TAB | Refills: 0 | Status: SHIPPED | OUTPATIENT
Start: 2018-06-27 | End: 2018-07-23 | Stop reason: SDUPTHER

## 2018-06-27 RX ORDER — ESCITALOPRAM OXALATE 20 MG/1
20 TABLET ORAL DAILY
Refills: 0 | COMMUNITY
Start: 2018-06-07 | End: 2019-05-02

## 2018-06-27 RX ORDER — LANOLIN ALCOHOL/MO/W.PET/CERES
1000 CREAM (GRAM) TOPICAL DAILY
COMMUNITY
End: 2019-06-11

## 2018-06-27 NOTE — PATIENT INSTRUCTIONS
Schedule of Personalized Health Plan  (Provide Copy to Patient)  The best way to stay healthy is to live a healthy lifestyle. A healthy lifestyle includes regular exercise, eating a well-balanced diet, keeping a healthy weight and not smoking. Regular physical exams and screening tests are another important way to take care of yourself. Preventive exams provided by health care providers can find health problems early when treatment works best and can keep you from getting certain diseases or illnesses. Preventive services include exams, lab tests, screenings, shots, monitoring and information to help you take care of your own health. All people over 65 should have a pneumonia shot. Pneumonia shots are usually only needed once in a lifetime unless your doctor decides differently. All people over 65 should have a yearly flu shot. People over 65 are at medium to high risk for Hepatitis B. Three shots are needed for complete protection. In addition to your physical exam, some screening tests are recommended:    Bone mass measurement (dexa scan) is recommended every two years if you have certain risk factors, such as personal history of vertebral fracture or chronic steroid medication use    Diabetes Mellitus screening is recommended every year. Glaucoma is an eye disease caused by high pressure in the eye. An eye exam is recommended every year. Cardiovascular screening tests that check your cholesterol and other blood fat (lipid) levels are recommended every five years. Colorectal Cancer screening tests help to find pre-cancerous polyps (growths in the colon) so they can be removed before they turn into cancer. Tests ordered for screening depend on your personal and family history risk factors.     Screening for Prostate Cancer is recommended yearly with a digital rectal exam and/or a PSA test    Here is a list of your current Health Maintenance items with a due date:  Health Maintenance Topic Date Due    MEDICARE YEARLY EXAM  03/14/2018    Influenza Age 5 to Adult  08/01/2018    DTaP/Tdap/Td series (2 - Td) 03/06/2027     1) follow-up in 3 months or sooner if worsening symptoms. 2) don't take zofran while driving or operating heavy machinery. 3) make appt. With GI sooner    4) don't take zofran at the same time as your psychiatric medications. Give 8 hours in between. 5) make sure to be hydrated. 6) Take mobic  With food. If you notice any blood/black tarry stools, diarrhea, abdominal pain, nausea, vomiting then stop medication immediately. Give us a call ASAP. 7) use heating pad, icy/hot, tiger balm for pain. Back Stretches: Exercises  Your Care Instructions  Here are some examples of exercises for stretching your back. Start each exercise slowly. Ease off the exercise if you start to have pain. Your doctor or physical therapist will tell you when you can start these exercises and which ones will work best for you. How to do the exercises  Overhead stretch    1. Stand comfortably with your feet shoulder-width apart. 2. Looking straight ahead, raise both arms over your head and reach toward the ceiling. Do not allow your head to tilt back. 3. Hold for 15 to 30 seconds, then lower your arms to your sides. 4. Repeat 2 to 4 times. Side stretch    1. Stand comfortably with your feet shoulder-width apart. 2. Raise one arm over your head, and then lean to the other side. 3. Slide your hand down your leg as you let the weight of your arm gently stretch your side muscles. Hold for 15 to 30 seconds. 4. Repeat 2 to 4 times on each side. Press-up    1. Lie on your stomach, supporting your body with your forearms. 2. Press your elbows down into the floor to raise your upper back. As you do this, relax your stomach muscles and allow your back to arch without using your back muscles. As your press up, do not let your hips or pelvis come off the floor.   3. Hold for 15 to 30 seconds, then relax. 4. Repeat 2 to 4 times. Relax and rest    1. Lie on your back with a rolled towel under your neck and a pillow under your knees. Extend your arms comfortably to your sides. 2. Relax and breathe normally. 3. Remain in this position for about 10 minutes. 4. If you can, do this 2 or 3 times each day. Follow-up care is a key part of your treatment and safety. Be sure to make and go to all appointments, and call your doctor if you are having problems. It's also a good idea to know your test results and keep a list of the medicines you take. Where can you learn more? Go to http://renatoprodukte24.comrobert.info/. Enter J053 in the search box to learn more about \"Back Stretches: Exercises. \"  Current as of: March 21, 2017  Content Version: 11.4  © 1447-9879 [employer]. Care instructions adapted under license by [employer] (which disclaims liability or warranty for this information). If you have questions about a medical condition or this instruction, always ask your healthcare professional. [address] any warranty or liability for your use of this information. Low Back Pain: Exercises  Your Care Instructions  Here are some examples of typical rehabilitation exercises for your condition. Start each exercise slowly. Ease off the exercise if you start to have pain. Your doctor or physical therapist will tell you when you can start these exercises and which ones will work best for you. How to do the exercises  Press-up    5. Lie on your stomach, supporting your body with your forearms. 6. Press your elbows down into the floor to raise your upper back. As you do this, relax your stomach muscles and allow your back to arch without using your back muscles. As your press up, do not let your hips or pelvis come off the floor. 7. Hold for 15 to 30 seconds, then relax. 8. Repeat 2 to 4 times.   Alternate arm and leg (bird dog) exercise    Do this exercise slowly. Try to keep your body straight at all times, and do not let one hip drop lower than the other. 5. Start on the floor, on your hands and knees. 6. Tighten your belly muscles. 7. Raise one leg off the floor, and hold it straight out behind you. Be careful not to let your hip drop down, because that will twist your trunk. 8. Hold for about 6 seconds, then lower your leg and switch to the other leg. 9. Repeat 8 to 12 times on each leg. 10. Over time, work up to holding for 10 to 30 seconds each time. 11. If you feel stable and secure with your leg raised, try raising the opposite arm straight out in front of you at the same time. Knee-to-chest exercise    5. Lie on your back with your knees bent and your feet flat on the floor. 6. Bring one knee to your chest, keeping the other foot flat on the floor (or keeping the other leg straight, whichever feels better on your lower back). 7. Keep your lower back pressed to the floor. Hold for at least 15 to 30 seconds. 8. Relax, and lower the knee to the starting position. 9. Repeat with the other leg. Repeat 2 to 4 times with each leg. 10. To get more stretch, put your other leg flat on the floor while pulling your knee to your chest.  Curl-ups    5. Lie on the floor on your back with your knees bent at a 90-degree angle. Your feet should be flat on the floor, about 12 inches from your buttocks. 6. Cross your arms over your chest. If this bothers your neck, try putting your hands behind your neck (not your head), with your elbows spread apart. 7. Slowly tighten your belly muscles and raise your shoulder blades off the floor. 8. Keep your head in line with your body, and do not press your chin to your chest.  9. Hold this position for 1 or 2 seconds, then slowly lower yourself back down to the floor. 10. Repeat 8 to 12 times. Pelvic tilt exercise    1. Lie on your back with your knees bent. 2. \"Brace\" your stomach.  This means to tighten your muscles by pulling in and imagining your belly button moving toward your spine. You should feel like your back is pressing to the floor and your hips and pelvis are rocking back. 3. Hold for about 6 seconds while you breathe smoothly. 4. Repeat 8 to 12 times. Heel dig bridging    1. Lie on your back with both knees bent and your ankles bent so that only your heels are digging into the floor. Your knees should be bent about 90 degrees. 2. Then push your heels into the floor, squeeze your buttocks, and lift your hips off the floor until your shoulders, hips, and knees are all in a straight line. 3. Hold for about 6 seconds as you continue to breathe normally, and then slowly lower your hips back down to the floor and rest for up to 10 seconds. 4. Do 8 to 12 repetitions. Hamstring stretch in doorway    1. Lie on your back in a doorway, with one leg through the open door. 2. Slide your leg up the wall to straighten your knee. You should feel a gentle stretch down the back of your leg. 3. Hold the stretch for at least 15 to 30 seconds. Do not arch your back, point your toes, or bend either knee. Keep one heel touching the floor and the other heel touching the wall. 4. Repeat with your other leg. 5. Do 2 to 4 times for each leg. Hip flexor stretch    1. Kneel on the floor with one knee bent and one leg behind you. Place your forward knee over your foot. Keep your other knee touching the floor. 2. Slowly push your hips forward until you feel a stretch in the upper thigh of your rear leg. 3. Hold the stretch for at least 15 to 30 seconds. Repeat with your other leg. 4. Do 2 to 4 times on each side. Wall sit    1. Stand with your back 10 to 12 inches away from a wall. 2. Lean into the wall until your back is flat against it. 3. Slowly slide down until your knees are slightly bent, pressing your lower back into the wall. 4. Hold for about 6 seconds, then slide back up the wall.   5. Repeat 8 to 12 times.  Follow-up care is a key part of your treatment and safety. Be sure to make and go to all appointments, and call your doctor if you are having problems. It's also a good idea to know your test results and keep a list of the medicines you take. Where can you learn more? Go to http://renato-robert.info/. Enter M028 in the search box to learn more about \"Low Back Pain: Exercises. \"  Current as of: March 21, 2017  Content Version: 11.4  © 9391-7717 [employer]. Care instructions adapted under license by [employer] (which disclaims liability or warranty for this information). If you have questions about a medical condition or this instruction, always ask your healthcare professional. [address] any warranty or liability for your use of this information.

## 2018-06-27 NOTE — PROGRESS NOTES
Shahnaz Farmer is a 40 y.o. male and presents for annual Medicare Wellness Visit. Problem List: Reviewed with patient and discussed risk factors. Patient Active Problem List   Diagnosis Code    Schizoaffective disorder, bipolar type ([facility] F25.0    PTSD (post-traumatic stress disorder) F43.10    Depression F32.9    Anxiety F41.9    Tardive dyskinesia G24.01    Allergic rhinitis J30.9    Vitamin D deficiency E55.9    Dyslipidemia E78.5    Essential hypertension I10    Morbid obesity with BMI of 40.0-44.9, adult ([facility] E66.01, Z68.41       Current medical providers:  Patient Care Team:  Zahraa Armstrong MD as PCP - General (Internal Medicine)    PSH: Reviewed with patient  History reviewed. No pertinent surgical history. SH: Reviewed with patient  Social History   Substance Use Topics    Smoking status: Former Smoker    Smokeless tobacco: Never Used      Comment: 3 packs a week for 5 yrs. Continue to not smoke.  Alcohol use 0.6 oz/week     1 Glasses of wine per week      Comment: some wine during holidays       FH: Reviewed with patient  Family History   Problem Relation Age of Onset    Thyroid Disease Mother      hypothyroidism    Heart Disease Father     Alcohol abuse Father     High Cholesterol Father     Bipolar Disorder Father        Medications/Allergies: Reviewed with patient  Current Outpatient Prescriptions on File Prior to Visit   Medication Sig Dispense Refill    loratadine (CLARITIN) 10 mg tablet Take 1 Tab by mouth daily. 90 Tab 3    losartan (COZAAR) 100 mg tablet take 1 tablet by mouth once daily 90 Tab 0    OTHER Fish oil 2400mg a daily liquid      diazePAM (VALIUM) 5 mg tablet Take 5 mg by mouth three (3) times daily.  fluPHENAZine (PROLIXIN) 5 mg tablet Take 12 mg by mouth three (3) times daily. Indications: 12 in AM and afternoon, 24mg at PM      topiramate (TOPAMAX) 100 mg tablet Take 200 mg by mouth daily.       LAMOTRIGINE (LAMICTAL PO) Take 100 mg by mouth two (2) times a day. No current facility-administered medications on file prior to visit. Allergies   Allergen Reactions    Prozac [Fluoxetine] Other (comments)     More suicidal    Ritalin [Methylphenidate] Nausea and Vomiting and Anxiety       Objective:  Visit Vitals    /80 (BP 1 Location: Left arm, BP Patient Position: Sitting)    Pulse 86    Temp 96.7 °F (35.9 °C) (Oral)    Resp 16    Ht 5' 11\" (1.803 m)    Wt 322 lb 12.8 oz (146.4 kg)    SpO2 96%    BMI 45.02 kg/m2    Body mass index is 45.02 kg/(m^2). Assessment of cognitive impairment: Alert and oriented x 3  Depression Screen:   PHQ over the last two weeks 3/6/2017   PHQ Not Done Active Diagnosis of Depression or Bipolar Disorder   Little interest or pleasure in doing things Nearly every day   Feeling down, depressed or hopeless Nearly every day   Total Score PHQ 2 6       Fall Risk Assessment:  No flowsheet data found. Functional Ability:   Does the patient exhibit a steady gait? yes   How long did it take the patient to get up and walk from a sitting position? 6 seconds   Is the patient self reliant?  (ie can do own laundry, meals, household chores)  yes     Does the patient handle his/her own medications? yes     Does the patient handle his/her own money? yes     Is the patients home safe (ie good lighting, handrails on stairs and bath, etc.)? yes     Did you notice or did patient express any hearing difficulties? no     Did you notice or did patient express any vision difficulties? Yes. Myopic and strebismus. Were distance and reading eye charts used? no       Advance Care Planning:   Patient was offered the opportunity to discuss advance care planning:  yes     Does patient have an Advance Directive:  yes   If no, did you provide information on [facility]? No. Per pt, already has ACP. Pt instructed to bring us a copy.         Plan:      Orders Placed This Encounter    CBC W/O DIFF    METABOLIC PANEL, COMPREHENSIVE    LIPID PANEL    VITAMIN D, 25 HYDROXY    HEMOGLOBIN A1C WITH EAG    URINALYSIS W/ RFLX MICROSCOPIC    escitalopram oxalate (LEXAPRO) 20 mg tablet    perphenazine (TRILAFON) 8 mg tablet    gabapentin (NEURONTIN) 600 mg tablet    FLINTSTONES MULTIVITAMIN PO    cyanocobalamin 1,000 mcg tablet    ondansetron (ZOFRAN ODT) 4 mg disintegrating tablet    meloxicam (MOBIC) 7.5 mg tablet       Health Maintenance   Topic Date Due    Influenza Age 5 to Adult  08/01/2018    MEDICARE YEARLY EXAM  06/28/2019    DTaP/Tdap/Td series (2 - Td) 03/06/2027       *Patient verbalized understanding and agreement with the plan. A copy of the After Visit Summary with personalized health plan was given to the patient today. ROS:   General: negative for - chills, fatigue, fever, weight loss or weight gain, night sweats  HEENT: negative for - no sore throat, nasal congestion, vision problems or ear problems  Resp: negative for - cough, shortness of breath or wheezing  CV: negative for - chest pain, palpitations, orthopnea or PND,  GI: negative for - abdominal pain, change in bowel habits, constipation, diarrhea, blood or black tarry stools, or positive for nausea/vomiting  : negative for - dysuria, hematuria, increase urgency and frequency, nocturia  Heme: negative for -excessive bleeding or bruising  Endo: negative for - polydipsia/polyuria or hot or cold intolerance  MSK: negative for -  joint swelling or muscle pain, positive for low back pain  Neuro: negative for - numbness, tingling, headache or dizziness  Derm: negative for - dry skin, hair changes, rash or skin lesion changes  Psych: negative for - anxiety, , irritability or mood swings, insomnia, depression stable.     OBJECTIVE:  PHYSICAL EXAM: Vitals:   Vitals:    06/27/18 0838 06/27/18 0907   BP: (!) 156/100 132/80   Pulse: 86    Resp: 16    Temp: 96.7 °F (35.9 °C)    TempSrc: Oral    SpO2: 96%    Weight: 322 lb 12.8 oz (146.4 kg) Height: 5' 11\" (1.803 m)      Exam:   Generally: Pleasant male in no acute distress    HEENT exam: Head: atraumatic     Eyes: Pupils equally round and reactive to light; fundoscopic exam is                         normal               Ears: bilaterally normal tympanic membrane, no erythema or exudate,                         normal light reflex               Mouth: clear, no erythema or exudate    Nares: moist mucosa/no erythema     Neck: supple, no lymphadenopathy, negative thyromegaly, negative                         carotid bruits bilaterally    Cardiac exam: regular, rate, and rhythm. No murmurs, gallops, or rubs. Normal S1 and S2.    Pulmonary exam: Clear to ausculation bilaterally    Abdominal exam: Positive bowel sounds in all four quadrants. Soft. Nondistended. Nontender. No hepatosplenomegaly. Prostate exam: deferred due to age    Extremities: 2+ dorsalis pedis bilaterally. No pedal edema bilaterally. Musculoskeletal exam: 5 out of 5 strength in upper and lower extremities bilaterally. Good range of motion in upper and lower extremities. 2 out of 2 reflexes in upper and lower extremities bilaterally. Neurological exam: Cranial nerves II-XII all intact. Normal sensation in upper and lower extremities. Normal gait.     Back exam: TTP in the low back area       LABS/RADIOLOGICAL TESTS:   Lab Results   Component Value Date/Time    WBC 7.7 01/29/2018 12:42 PM    HGB 14.8 01/29/2018 12:42 PM    HCT 42.4 01/29/2018 12:42 PM    PLATELET 253 20/17/6726 12:42 PM     Lab Results   Component Value Date/Time    Sodium 138 01/29/2018 12:42 PM    Potassium 4.3 01/29/2018 12:42 PM    Chloride 98 01/29/2018 12:42 PM    CO2 23 01/29/2018 12:42 PM    Glucose 92 01/29/2018 12:42 PM    BUN 8 01/29/2018 12:42 PM    Creatinine 0.89 01/29/2018 12:42 PM     Lab Results   Component Value Date/Time    Cholesterol, total 218 (H) 03/06/2017 03:06 PM    HDL Cholesterol 33 (L) 03/06/2017 03:06 PM    LDL,Direct 152 (H) 01/29/2018 12:42 PM    LDL, calculated 139 (H) 03/06/2017 03:06 PM    Triglyceride 230 (H) 03/06/2017 03:06 PM     No results found for: GPT     Component      Latest Ref Rng & Units 1/29/2018          12:42 PM   Hemoglobin A1c, (calculated)      4.8 - 5.6 % 5.2   Estimated average glucose      mg/dL 103     Previous labs          ASSESSMENT/PLAN:  1. Encounter for Medicare annual wellness exam    2. Dyslipidemia: we will see what the labs show. Continue diet, exercise and fish oil. -     METABOLIC PANEL, COMPREHENSIVE; Future  -     LIPID PANEL; Future    3. Benign hypertension without CHF: he did not take his BP meds this AM, but on recheck was good. Continue diet, exercise and cozaar.  -     CBC W/O DIFF; Future  -     URINALYSIS W/ RFLX MICROSCOPIC; Future    4. Nausea and vomiting, intractability of vomiting not specified, unspecified vomiting type: he did not want me to try to get a sooner appt. With GI. Told him to call the GI office to see if there are any cancellations and can be seen sooner. He said that August was the earliest he could get. He was told to Don't take zofran while driving or operating heavy machinery. He was also told to not take the zofran at the same time as his psychiatric medications. Take medications 8 hours in between if you have to take the medications. -     ondansetron (ZOFRAN ODT) 4 mg disintegrating tablet; Take 1 Tab by mouth every eight (8) hours as needed for Nausea. 5. Vitamin D deficiency  -     VITAMIN D, 25 HYDROXY; Future    6. Side effect of medication  -     HEMOGLOBIN A1C WITH EAG; Future    7. Obesity without serious comorbidity, unspecified classification, unspecified obesity type  -     HEMOGLOBIN A1C WITH EAG; Future    8. Chronic bilateral low back pain without sciatica: think musculoskeletal. Use heating pad, icy/hot, tiger balm for pain. Given exercises. Pt told to Take mobic With food.  If you notice any blood/black tarry stools, diarrhea, abdominal pain, nausea, vomiting then stop medication immediately. Give us a call ASAP. -     meloxicam (MOBIC) 7.5 mg tablet; Take 1-2 po daily prn pain    9. Morbid obesity with BMI of 40.0-44.9, adult [facility]): not well controlled. Work on diet and exercise. Assessment & Plan:  Uncontrolled, based on history, physical exam and review of pertinent labs, studies and medications; meds reconciled; continue current treatment plan, lifestyle modifications recommended. Key Obesity Meds     Patient is on no anti-obesity meds. Lab Results   Component Value Date/Time    Hemoglobin A1c 5.2 01/29/2018 12:42 PM    Glucose 92 01/29/2018 12:42 PM    Cholesterol, total 218 03/06/2017 03:06 PM    HDL Cholesterol 33 03/06/2017 03:06 PM    LDL, calculated 139 03/06/2017 03:06 PM    LDL,Direct 152 01/29/2018 12:42 PM    Triglyceride 230 03/06/2017 03:06 PM    TSH 3.190 01/29/2018 12:42 PM    Sodium 138 01/29/2018 12:42 PM    Potassium 4.3 01/29/2018 12:42 PM    ALT (SGPT) 41 01/29/2018 12:42 PM    AST (SGOT) 28 01/29/2018 12:42 PM    VITAMIN D, 25-HYDROXY 22.3 05/03/2017 11:00 AM     10. Patient verbalized understanding and agreement with the plan. 11. Patient was given an after visit summary. 12.   Follow-up Disposition:  Return in about 3 months (around 9/27/2018) for f/u HTN/lipids. or sooner if worsening symptoms.           Mahesh Mckinnon MD

## 2018-06-27 NOTE — ASSESSMENT & PLAN NOTE
Uncontrolled, based on history, physical exam and review of pertinent labs, studies and medications; meds reconciled; continue current treatment plan, lifestyle modifications recommended. Key Obesity Meds     Patient is on no anti-obesity meds.         Lab Results   Component Value Date/Time    Hemoglobin A1c 5.2 01/29/2018 12:42 PM    Glucose 92 01/29/2018 12:42 PM    Cholesterol, total 218 03/06/2017 03:06 PM    HDL Cholesterol 33 03/06/2017 03:06 PM    LDL, calculated 139 03/06/2017 03:06 PM    LDL,Direct 152 01/29/2018 12:42 PM    Triglyceride 230 03/06/2017 03:06 PM    TSH 3.190 01/29/2018 12:42 PM    Sodium 138 01/29/2018 12:42 PM    Potassium 4.3 01/29/2018 12:42 PM    ALT (SGPT) 41 01/29/2018 12:42 PM    AST (SGOT) 28 01/29/2018 12:42 PM    VITAMIN D, 25-HYDROXY 22.3 05/03/2017 11:00 AM

## 2018-06-27 NOTE — TELEPHONE ENCOUNTER
[employer] has received your information, and the request will be reviewed. Allow 72 hours for decision.

## 2018-06-27 NOTE — MR AVS SNAPSHOT
[address] [facility] 
 
 
 [address] [facility] [address] [id number] 
[phone] Patient: Stephanie Felder MRN: [id number] :1975 Visit Information Date & Time Provider Department Dept. Phone Encounter #  
 2018  9:15 AM Ria Rosenthal MD [address] [phone] [id number] Follow-up Instructions Return in about 3 months (around 2018) for f/u HTN/lipids. Upcoming Health Maintenance Date Due Influenza Age 5 to Adult 2018 MEDICARE YEARLY EXAM 2019 DTaP/Tdap/Td series (2 - Td) 3/6/2027 Allergies as of 2018  Review Complete On: 2018 By: Alfonso Zhu MD  
  
 Severity Noted Reaction Type Reactions Prozac [Fluoxetine]  2017    Other (comments) More suicidal  
 Ritalin [Methylphenidate]  2010    Nausea and Vomiting, Anxiety Current Immunizations  Reviewed on 4/10/2018 Name Date Influenza Vaccine 10/4/2016 TD Vaccine 2004 Tdap 3/6/2017 Not reviewed this visit You Were Diagnosed With   
  
 Codes Comments Encounter for Medicare annual wellness exam    -  Primary ICD-10-CM: Z00.00 ICD-9-CM: V70.0 Dyslipidemia     ICD-10-CM: E78.5 ICD-9-CM: 272.4 Benign hypertension without CHF     ICD-10-CM: I10 
ICD-9-CM: 401.1 Vitamin D deficiency     ICD-10-CM: E55.9 ICD-9-CM: 268.9 Side effect of medication     ICD-10-CM: T88. 7XXA ICD-9-CM: 995.20 Obesity without serious comorbidity, unspecified classification, unspecified obesity type     ICD-10-CM: E66.9 ICD-9-CM: 278.00 Nausea and vomiting, intractability of vomiting not specified, unspecified vomiting type     ICD-10-CM: R11.2 ICD-9-CM: 787.01 Chronic bilateral low back pain without sciatica     ICD-10-CM: M54.5, G89.29 ICD-9-CM: 724.2, 338.29 Vitals BP Pulse Temp Resp Height(growth percentile) Weight(growth percentile)  132/80 (BP 1 Location: Left arm, BP Patient Position: Sitting) 86 96.7 °F (35.9 °C) (Oral) 16 5' 11\" (1.803 m) 322 lb 12.8 oz (146.4 kg) SpO2 BMI Smoking Status 96% 45.02 kg/m2 Former Smoker Vitals History BMI and BSA Data Body Mass Index Body Surface Area 45.02 kg/m 2 2.71 m 2 Preferred Pharmacy Pharmacy Name Phone [facility] [phone] Your Updated Medication List  
  
   
This list is accurate as of 6/27/18  9:15 AM.  Always use your most recent med list.  
  
  
  
  
 cyanocobalamin 1,000 mcg tablet Take 1,000 mcg by mouth daily. escitalopram oxalate 20 mg tablet Commonly known as:  Fredick Fan Take 20 mg by mouth daily. FLINTSTONES MULTIVITAMIN PO Take  by mouth daily. fluPHENAZine 5 mg tablet Commonly known as:  PROLIXIN Take 12 mg by mouth three (3) times daily. Indications: 12 in AM and afternoon, 24mg at PM  
  
 gabapentin 600 mg tablet Commonly known as:  NEURONTIN  
600 mg. Indications: 600mg in AM and afternoon, 1200mg in PM= 2400mg daily LAMICTAL PO Take 100 mg by mouth two (2) times a day. loratadine 10 mg tablet Commonly known as:  Kip Manual Take 1 Tab by mouth daily. losartan 100 mg tablet Commonly known as:  COZAAR  
take 1 tablet by mouth once daily  
  
 meloxicam 7.5 mg tablet Commonly known as:  MOBIC Take 1-2 po daily prn pain  
  
 ondansetron 4 mg disintegrating tablet Commonly known as:  ZOFRAN ODT Take 1 Tab by mouth every eight (8) hours as needed for Nausea. OTHER Fish oil 2400mg a daily liquid  
  
 perphenazine 8 mg tablet Commonly known as:  TRILAFON  
16 mg three (3) times daily. Indications: 16mg in  AM and afternoon, 24mg at PM  
  
 TOPAMAX 100 mg tablet Generic drug:  topiramate Take 200 mg by mouth daily. VALIUM 5 mg tablet Generic drug:  diazePAM  
Take 5 mg by mouth three (3) times daily. Prescriptions Sent to Pharmacy Refills  
 ondansetron (ZOFRAN ODT) 4 mg disintegrating tablet 0 Sig: Take 1 Tab by mouth every eight (8) hours as needed for Nausea. Class: Normal  
 Pharmacy: [address] #: [phone] Route: Oral  
 meloxicam (MOBIC) 7.5 mg tablet 0 Sig: Take 1-2 po daily prn pain  
 Class: Normal  
 Pharmacy: [place] [address] Ph #: [phone] Follow-up Instructions Return in about 3 months (around 9/27/2018) for f/u HTN/lipids. To-Do List   
 06/27/2018 Lab:  CBC W/O DIFF   
  
 06/27/2018 Lab:  HEMOGLOBIN A1C WITH EAG   
  
 06/27/2018 Lab:  LIPID PANEL   
  
 06/27/2018 Lab:  METABOLIC PANEL, COMPREHENSIVE   
  
 06/27/2018 Lab:  URINALYSIS W/ RFLX MICROSCOPIC   
  
 06/27/2018 Lab:  VITAMIN D, 25 HYDROXY Patient Instructions Schedule of Personalized Health Plan (Provide Copy to Patient) The best way to stay healthy is to live a healthy lifestyle. A healthy lifestyle includes regular exercise, eating a well-balanced diet, keeping a healthy weight and not smoking. Regular physical exams and screening tests are another important way to take care of yourself. Preventive exams provided by health care providers can find health problems early when treatment works best and can keep you from getting certain diseases or illnesses. Preventive services include exams, lab tests, screenings, shots, monitoring and information to help you take care of your own health. All people over 65 should have a pneumonia shot. Pneumonia shots are usually only needed once in a lifetime unless your doctor decides differently. All people over 65 should have a yearly flu shot. People over 65 are at medium to high risk for Hepatitis B. Three shots are needed for complete protection.  
In addition to your physical exam, some screening tests are recommended: 
 
Bone mass measurement (dexa scan) is recommended every two years if you have certain risk factors, such as personal history of vertebral fracture or chronic steroid medication use Diabetes Mellitus screening is recommended every year. Glaucoma is an eye disease caused by high pressure in the eye. An eye exam is recommended every year. Cardiovascular screening tests that check your cholesterol and other blood fat (lipid) levels are recommended every five years. Colorectal Cancer screening tests help to find pre-cancerous polyps (growths in the colon) so they can be removed before they turn into cancer. Tests ordered for screening depend on your personal and family history risk factors. Screening for Prostate Cancer is recommended yearly with a digital rectal exam and/or a PSA test 
 
Here is a list of your current Health Maintenance items with a due date: 
Health Maintenance Topic Date Due  MEDICARE YEARLY EXAM  03/14/2018  Influenza Age 5 to Adult  08/01/2018  DTaP/Tdap/Td series (2 - Td) 03/06/2027  
 
1) follow-up in 3 months or sooner if worsening symptoms. 2) don't take zofran while driving or operating heavy machinery. 3) make appt. With GI sooner 4) don't take zofran at the same time as your psychiatric medications. Give 8 hours in between. 5) make sure to be hydrated. 6) Take mobic  With food. If you notice any blood/black tarry stools, diarrhea, abdominal pain, nausea, vomiting then stop medication immediately. Give us a call ASAP. 7) use heating pad, icy/hot, tiger balm for pain. Back Stretches: Exercises Your Care Instructions Here are some examples of exercises for stretching your back. Start each exercise slowly. Ease off the exercise if you start to have pain. Your doctor or physical therapist will tell you when you can start these exercises and which ones will work best for you. How to do the exercises Overhead stretch 1. Stand comfortably with your feet shoulder-width apart. 2. Looking straight ahead, raise both arms over your head and reach toward the ceiling. Do not allow your head to tilt back. 3. Hold for 15 to 30 seconds, then lower your arms to your sides. 4. Repeat 2 to 4 times. Side stretch 1. Stand comfortably with your feet shoulder-width apart. 2. Raise one arm over your head, and then lean to the other side. 3. Slide your hand down your leg as you let the weight of your arm gently stretch your side muscles. Hold for 15 to 30 seconds. 4. Repeat 2 to 4 times on each side. Press-up 1. Lie on your stomach, supporting your body with your forearms. 2. Press your elbows down into the floor to raise your upper back. As you do this, relax your stomach muscles and allow your back to arch without using your back muscles. As your press up, do not let your hips or pelvis come off the floor. 3. Hold for 15 to 30 seconds, then relax. 4. Repeat 2 to 4 times. Relax and rest 
 
1. Lie on your back with a rolled towel under your neck and a pillow under your knees. Extend your arms comfortably to your sides. 2. Relax and breathe normally. 3. Remain in this position for about 10 minutes. 4. If you can, do this 2 or 3 times each day. Follow-up care is a key part of your treatment and safety. Be sure to make and go to all appointments, and call your doctor if you are having problems. It's also a good idea to know your test results and keep a list of the medicines you take. Where can you learn more? Go to http://renato-robert.info/. Enter C402 in the search box to learn more about \"Back Stretches: Exercises. \" Current as of: March 21, 2017 Content Version: 11.4 © 6010-3012 Healthwise, Incorporated. Care instructions adapted under license by [employer] (which disclaims liability or warranty for this information).  If you have questions about a medical condition or this instruction, always ask your healthcare professional. [place] [address] any warranty or liability for your use of this information. Low Back Pain: Exercises Your Care Instructions Here are some examples of typical rehabilitation exercises for your condition. Start each exercise slowly. Ease off the exercise if you start to have pain. Your doctor or physical therapist will tell you when you can start these exercises and which ones will work best for you. How to do the exercises Press-up 5. Lie on your stomach, supporting your body with your forearms. 6. Press your elbows down into the floor to raise your upper back. As you do this, relax your stomach muscles and allow your back to arch without using your back muscles. As your press up, do not let your hips or pelvis come off the floor. 7. Hold for 15 to 30 seconds, then relax. 8. Repeat 2 to 4 times. Alternate arm and leg (bird dog) exercise Do this exercise slowly. Try to keep your body straight at all times, and do not let one hip drop lower than the other. 5. Start on the floor, on your hands and knees. 6. Tighten your belly muscles. 7. Raise one leg off the floor, and hold it straight out behind you. Be careful not to let your hip drop down, because that will twist your trunk. 8. Hold for about 6 seconds, then lower your leg and switch to the other leg. 9. Repeat 8 to 12 times on each leg. 10. Over time, work up to holding for 10 to 30 seconds each time. 11. If you feel stable and secure with your leg raised, try raising the opposite arm straight out in front of you at the same time. Knee-to-chest exercise 5. Lie on your back with your knees bent and your feet flat on the floor. 6. Bring one knee to your chest, keeping the other foot flat on the floor (or keeping the other leg straight, whichever feels better on your lower back). 7. Keep your lower back pressed to the floor. Hold for at least 15 to 30 seconds.  
8. Relax, and lower the knee to the starting position. 9. Repeat with the other leg. Repeat 2 to 4 times with each leg. 10. To get more stretch, put your other leg flat on the floor while pulling your knee to your chest. 
Curl-ups 5. Lie on the floor on your back with your knees bent at a 90-degree angle. Your feet should be flat on the floor, about 12 inches from your buttocks. 6. Cross your arms over your chest. If this bothers your neck, try putting your hands behind your neck (not your head), with your elbows spread apart. 7. Slowly tighten your belly muscles and raise your shoulder blades off the floor. 8. Keep your head in line with your body, and do not press your chin to your chest. 
9. Hold this position for 1 or 2 seconds, then slowly lower yourself back down to the floor. 10. Repeat 8 to 12 times. Pelvic tilt exercise 1. Lie on your back with your knees bent. 2. \"Brace\" your stomach. This means to tighten your muscles by pulling in and imagining your belly button moving toward your spine. You should feel like your back is pressing to the floor and your hips and pelvis are rocking back. 3. Hold for about 6 seconds while you breathe smoothly. 4. Repeat 8 to 12 times. Heel dig bridging 1. Lie on your back with both knees bent and your ankles bent so that only your heels are digging into the floor. Your knees should be bent about 90 degrees. 2. Then push your heels into the floor, squeeze your buttocks, and lift your hips off the floor until your shoulders, hips, and knees are all in a straight line. 3. Hold for about 6 seconds as you continue to breathe normally, and then slowly lower your hips back down to the floor and rest for up to 10 seconds. 4. Do 8 to 12 repetitions. Hamstring stretch in doorway 1. Lie on your back in a doorway, with one leg through the open door. 2. Slide your leg up the wall to straighten your knee. You should feel a gentle stretch down the back of your leg.  
3. Hold the stretch for at least 15 to 30 seconds. Do not arch your back, point your toes, or bend either knee. Keep one heel touching the floor and the other heel touching the wall. 4. Repeat with your other leg. 5. Do 2 to 4 times for each leg. Hip flexor stretch 1. Kneel on the floor with one knee bent and one leg behind you. Place your forward knee over your foot. Keep your other knee touching the floor. 2. Slowly push your hips forward until you feel a stretch in the upper thigh of your rear leg. 3. Hold the stretch for at least 15 to 30 seconds. Repeat with your other leg. 4. Do 2 to 4 times on each side. Wall sit 1. Stand with your back 10 to 12 inches away from a wall. 2. Lean into the wall until your back is flat against it. 3. Slowly slide down until your knees are slightly bent, pressing your lower back into the wall. 4. Hold for about 6 seconds, then slide back up the wall. 5. Repeat 8 to 12 times. Follow-up care is a key part of your treatment and safety. Be sure to make and go to all appointments, and call your doctor if you are having problems. It's also a good idea to know your test results and keep a list of the medicines you take. Where can you learn more? Go to http://renato-robert.info/. Enter O526 in the search box to learn more about \"Low Back Pain: Exercises. \" Current as of: March 21, 2017 Content Version: 11.4 © 8980-3949 Healthwise, [employer]. Care instructions adapted under license by [employer] (which disclaims liability or warranty for this information). If you have questions about a medical condition or this instruction, always ask your healthcare professional. [address] any warranty or liability for your use of this information. Introducing [facility]! Dear Leon Quiles: 
Thank you for requesting a [employer] account. Our records indicate that you already have an active [employer] account.   You can access your account anytime at [email or web address] Did you know that you can access your hospital and ER discharge instructions at any time in [facility] ([facility])? You can also review all of your test results from your hospital stay or ER visit. Additional Information If you have questions, please visit the Frequently Asked Questions section of the [facility]) website at [email or web address] Remember, [facility] ([facility]) is NOT to be used for urgent needs. For medical emergencies, dial 911. Now available from your iPhone and Android! Please provide this summary of care documentation to your next provider. Your primary care clinician is listed as Darwin Montez. If you have any questions after today's visit, please call [phone].
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
## 2018-06-27 NOTE — PROGRESS NOTES
ROOM # 1    Alexis Marie presents today for   Chief Complaint   Patient presents with    Weight Gain     24lb weight gain    Annual Wellness Visit       Alexis Marie preferred language for health care discussion is english/other. Is someone accompanying this pt? no    Is the patient using any DME equipment during OV? no    Depression Screening:  PHQ over the last two weeks 3/6/2017   PHQ Not Done Active Diagnosis of Depression or Bipolar Disorder   Little interest or pleasure in doing things Nearly every day   Feeling down, depressed or hopeless Nearly every day   Total Score PHQ 2 6       Learning Assessment:  Learning Assessment 3/6/2017   PRIMARY LEARNER Patient   HIGHEST LEVEL OF EDUCATION - PRIMARY LEARNER  2 YEARS OF COLLEGE   BARRIERS PRIMARY LEARNER COGNITIVE   CO-LEARNER CAREGIVER No   PRIMARY LANGUAGE ENGLISH   LEARNER PREFERENCE PRIMARY LISTENING     VIDEOS     READING   ANSWERED BY pateint   RELATIONSHIP SELF       Abuse Screening:  Abuse Screening Questionnaire 6/27/2018   Do you ever feel afraid of your partner? N   Are you in a relationship with someone who physically or mentally threatens you? N   Is it safe for you to go home? Y       Fall Risk  No flowsheet data found. Health Maintenance reviewed and discussed per provider. Yes    Alexis Marie is due for   Health Maintenance Due   Topic Date Due    MEDICARE YEARLY EXAM  03/14/2018      Please order/place referral if appropriate. Advance Directive:  1. Do you have an advance directive in place? Patient Reply: no    2. If not, would you like material regarding how to put one in place? Patient Reply: no    Coordination of Care:  1. Have you been to the ER, urgent care clinic since your last visit? Hospitalized since your last visit? no    2. Have you seen or consulted any other health care providers outside of the [address] [place] [address] since your last visit? Include any pap smears or colon screening.  no

## 2018-06-27 NOTE — ACP (ADVANCE CARE PLANNING)
Advance Care Planning (ACP) Provider Conversation Snapshot    Date of ACP Conversation: 06/27/18  Persons included in Conversation:  patient  Length of ACP Conversation in minutes:  <16 minutes (Non-Billable)    Authorized Decision Maker (if patient is incapable of making informed decisions):    This person is:   Healthcare Agent/Medical Power of  under Advance Directive          For Patients with Decision Making Capacity:   Values/Goals: Exploration of values, goals, and preferences if recovery is not expected, even with continued medical treatment in the event of:  Imminent death    Conversation Outcomes / Follow-Up Plan:   Recommended communicating the plan and making copies for the healthcare agent, personal physician, and others as appropriate (e.g., health system)

## 2018-06-28 ENCOUNTER — TELEPHONE (OUTPATIENT)
Dept: INTERNAL MEDICINE CLINIC | Age: 43
End: 2018-06-28

## 2018-06-28 DIAGNOSIS — E78.5 DYSLIPIDEMIA: Primary | ICD-10-CM

## 2018-06-28 DIAGNOSIS — E55.9 VITAMIN D DEFICIENCY: ICD-10-CM

## 2018-06-28 LAB
25(OH)D3+25(OH)D2 SERPL-MCNC: 19.2 NG/ML (ref 30–100)
ALBUMIN SERPL-MCNC: 4.8 G/DL (ref 3.5–5.5)
ALBUMIN/GLOB SERPL: 1.9 {RATIO} (ref 1.2–2.2)
ALP SERPL-CCNC: 110 IU/L (ref 39–117)
ALT SERPL-CCNC: 44 IU/L (ref 0–44)
APPEARANCE UR: CLEAR
AST SERPL-CCNC: 23 IU/L (ref 0–40)
BILIRUB SERPL-MCNC: 0.4 MG/DL (ref 0–1.2)
BILIRUB UR QL STRIP: NEGATIVE
BUN SERPL-MCNC: 9 MG/DL (ref 6–24)
BUN/CREAT SERPL: 10 (ref 9–20)
CALCIUM SERPL-MCNC: 9.3 MG/DL (ref 8.7–10.2)
CHLORIDE SERPL-SCNC: 100 MMOL/L (ref 96–106)
CHOLEST SERPL-MCNC: 221 MG/DL (ref 100–199)
CO2 SERPL-SCNC: 23 MMOL/L (ref 20–29)
COLOR UR: YELLOW
CREAT SERPL-MCNC: 0.88 MG/DL (ref 0.76–1.27)
ERYTHROCYTE [DISTWIDTH] IN BLOOD BY AUTOMATED COUNT: 13.6 % (ref 12.3–15.4)
EST. AVERAGE GLUCOSE BLD GHB EST-MCNC: 105 MG/DL
GLOBULIN SER CALC-MCNC: 2.5 G/DL (ref 1.5–4.5)
GLUCOSE SERPL-MCNC: 88 MG/DL (ref 65–99)
GLUCOSE UR QL: NEGATIVE
HBA1C MFR BLD: 5.3 % (ref 4.8–5.6)
HCT VFR BLD AUTO: 42.1 % (ref 37.5–51)
HDLC SERPL-MCNC: 31 MG/DL
HGB BLD-MCNC: 14.1 G/DL (ref 13–17.7)
HGB UR QL STRIP: NEGATIVE
INTERPRETATION, 910389: NORMAL
KETONES UR QL STRIP: NEGATIVE
LDLC SERPL CALC-MCNC: 112 MG/DL (ref 0–99)
LEUKOCYTE ESTERASE UR QL STRIP: NEGATIVE
MCH RBC QN AUTO: 28.8 PG (ref 26.6–33)
MCHC RBC AUTO-ENTMCNC: 33.5 G/DL (ref 31.5–35.7)
MCV RBC AUTO: 86 FL (ref 79–97)
MICRO URNS: NORMAL
NITRITE UR QL STRIP: NEGATIVE
PH UR STRIP: 5 [PH] (ref 5–7.5)
PLATELET # BLD AUTO: 326 X10E3/UL (ref 150–379)
POTASSIUM SERPL-SCNC: 4 MMOL/L (ref 3.5–5.2)
PROT SERPL-MCNC: 7.3 G/DL (ref 6–8.5)
PROT UR QL STRIP: NEGATIVE
RBC # BLD AUTO: 4.89 X10E6/UL (ref 4.14–5.8)
SODIUM SERPL-SCNC: 143 MMOL/L (ref 134–144)
SP GR UR: 1.02 (ref 1–1.03)
TRIGL SERPL-MCNC: 392 MG/DL (ref 0–149)
UROBILINOGEN UR STRIP-MCNC: 0.2 MG/DL (ref 0.2–1)
VLDLC SERPL CALC-MCNC: 78 MG/DL (ref 5–40)
WBC # BLD AUTO: 7.5 X10E3/UL (ref 3.4–10.8)

## 2018-06-28 NOTE — TELEPHONE ENCOUNTER
Is there any way this can be appealed? I had to use zofran because this was the only antiemetic that would be safe with his psychiatric medications.

## 2018-06-28 NOTE — PROGRESS NOTES
Please let pt know that labs were normal except:    1) lipids are up. Tri and needs to be 150 or less. TC: 221 and needs to be 200 or less. LDL: 112 and needs to be 100 or less. Work on diet and exercise. Is he willing to try fenofibrate 48mg one po daily #30 with 3 refills. If so, will need to have Lipids and LFT's checked after 6 weeks. 2) vitamin D is low at 19.2. Will send electronically vitamin d2 50,000 international units  Take one po weekly x4 weeks #4 no refills. Will recheck levels after 6 weeks since will be also getting lipids checked so he only has to come once to the lab.

## 2018-06-29 ENCOUNTER — TELEPHONE (OUTPATIENT)
Dept: INTERNAL MEDICINE CLINIC | Age: 43
End: 2018-06-29

## 2018-06-29 ENCOUNTER — APPOINTMENT (OUTPATIENT)
Dept: CT IMAGING | Age: 43
End: 2018-06-29
Attending: EMERGENCY MEDICINE
Payer: MEDICAID

## 2018-06-29 ENCOUNTER — HOSPITAL ENCOUNTER (EMERGENCY)
Age: 43
Discharge: HOME | End: 2018-06-29
Attending: EMERGENCY MEDICINE | Admitting: EMERGENCY MEDICINE
Payer: MEDICAID

## 2018-06-29 ENCOUNTER — APPOINTMENT (OUTPATIENT)
Dept: GENERAL RADIOLOGY | Age: 43
End: 2018-06-29
Attending: EMERGENCY MEDICINE
Payer: MEDICAID

## 2018-06-29 VITALS
HEART RATE: 97 BPM | SYSTOLIC BLOOD PRESSURE: 135 MMHG | OXYGEN SATURATION: 99 % | DIASTOLIC BLOOD PRESSURE: 98 MMHG | BODY MASS INDEX: 42.66 KG/M2 | WEIGHT: 315 LBS | RESPIRATION RATE: 17 BRPM | TEMPERATURE: 97.8 F | HEIGHT: 72 IN
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
## 2018-06-29 DIAGNOSIS — S09.90XA INJURY OF HEAD, INITIAL ENCOUNTER: ICD-10-CM

## 2018-06-29 DIAGNOSIS — S16.1XXA STRAIN OF NECK MUSCLE, INITIAL ENCOUNTER: ICD-10-CM

## 2018-06-29 DIAGNOSIS — S20.229A CONTUSION OF BACK, UNSPECIFIED LATERALITY, INITIAL ENCOUNTER: ICD-10-CM

## 2018-06-29 DIAGNOSIS — E55.9 VITAMIN D DEFICIENCY: ICD-10-CM

## 2018-06-29 DIAGNOSIS — E78.5 DYSLIPIDEMIA: Primary | ICD-10-CM

## 2018-06-29 PROCEDURE — 99284 EMERGENCY DEPT VISIT MOD MDM: CPT

## 2018-06-29 PROCEDURE — 74011250637 HC RX REV CODE- 250/637: Performed by: EMERGENCY MEDICINE

## 2018-06-29 PROCEDURE — 96372 THER/PROPH/DIAG INJ SC/IM: CPT

## 2018-06-29 PROCEDURE — 70450 CT HEAD/BRAIN W/O DYE: CPT

## 2018-06-29 PROCEDURE — 72125 CT NECK SPINE W/O DYE: CPT

## 2018-06-29 PROCEDURE — 74011250636 HC RX REV CODE- 250/636: Performed by: EMERGENCY MEDICINE

## 2018-06-29 PROCEDURE — 72110 X-RAY EXAM L-2 SPINE 4/>VWS: CPT

## 2018-06-29 RX ORDER — KETOROLAC TROMETHAMINE 30 MG/ML
60 INJECTION, SOLUTION INTRAMUSCULAR; INTRAVENOUS
Status: COMPLETED | OUTPATIENT
Start: 2018-06-29 | End: 2018-06-29

## 2018-06-29 RX ORDER — HYDROCODONE BITARTRATE AND ACETAMINOPHEN 5; 325 MG/1; MG/1
1 TABLET ORAL
Status: COMPLETED | OUTPATIENT
Start: 2018-06-29 | End: 2018-06-29

## 2018-06-29 RX ORDER — CLONIDINE HYDROCHLORIDE 0.1 MG/1
0.2 TABLET ORAL
Status: COMPLETED | OUTPATIENT
Start: 2018-06-29 | End: 2018-06-29

## 2018-06-29 RX ORDER — ERGOCALCIFEROL 1.25 MG/1
50000 CAPSULE ORAL
Qty: 4 CAP | Refills: 0 | Status: SHIPPED | OUTPATIENT
Start: 2018-06-29 | End: 2019-05-02

## 2018-06-29 RX ORDER — FENOFIBRATE 48 MG/1
48 TABLET, COATED ORAL DAILY
Qty: 30 TAB | Refills: 3 | Status: SHIPPED | OUTPATIENT
Start: 2018-06-29 | End: 2018-10-08 | Stop reason: SDUPTHER

## 2018-06-29 RX ADMIN — CLONIDINE HYDROCHLORIDE 0.2 MG: 0.1 TABLET ORAL at 13:41

## 2018-06-29 RX ADMIN — HYDROCODONE BITARTRATE AND ACETAMINOPHEN 1 TABLET: 5; 325 TABLET ORAL at 13:23

## 2018-06-29 RX ADMIN — KETOROLAC TROMETHAMINE 60 MG: 30 INJECTION, SOLUTION INTRAMUSCULAR at 14:56

## 2018-06-29 NOTE — ED NOTES
I have reviewed discharge instruction and prescriptions with patient. Patient verbalized understanding and has no further questions at this time. Education taught and patient verbalized understanding of education. Teach back method used. Armband removed and shredded per patients request.    Patients pain 1/10. Belongings given to patient. Patient discharged with cab to home.

## 2018-06-29 NOTE — TELEPHONE ENCOUNTER
Notes Recorded by Joann Maya MD on 2018 at 3:51 PM  Please let pt know that labs were normal except:    1) lipids are up. Tri and needs to be 150 or less. TC: 221 and needs to be 200 or less. LDL: 112 and needs to be 100 or less. Work on diet and exercise. Is he willing to try fenofibrate 48mg one po daily #30 with 3 refills. If so, will need to have Lipids and LFT's checked after 6 weeks. 2) vitamin D is low at 19.2. Will send electronically vitamin d2 50,000 international units  Take one po weekly x4 weeks #4 no refills. Will recheck levels after 6 weeks since will be also getting lipids checked so he only has to come once to the lab.
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
## 2018-06-29 NOTE — TELEPHONE ENCOUNTER
Called pt 2 pt identifiers confirmed informed pt of lab results he stated understanding and that he would like to try the medication if it could please be sent to the [facility] on [address] verified that that is the pharmacy on file. Pt stated that he will come in in 6 weeks to have labs rechecked no other questions or concerns noted at this time.

## 2018-06-29 NOTE — TELEPHONE ENCOUNTER
----- Message from Skylar Cardona MD sent at 2018  3:51 PM EDT -----  Please let pt know that labs were normal except:    1) lipids are up. Tri and needs to be 150 or less. TC: 221 and needs to be 200 or less. LDL: 112 and needs to be 100 or less. Work on diet and exercise. Is he willing to try fenofibrate 48mg one po daily #30 with 3 refills. If so, will need to have Lipids and LFT's checked after 6 weeks. 2) vitamin D is low at 19.2. Will send electronically vitamin d2 50,000 international units  Take one po weekly x4 weeks #4 no refills. Will recheck levels after 6 weeks since will be also getting lipids checked so he only has to come once to the lab.

## 2018-06-29 NOTE — TELEPHONE ENCOUNTER
Sent electronically:    Requested Prescriptions     Signed Prescriptions Disp Refills    fenofibrate nanocrystallized (TRICOR) 48 mg tablet 30 Tab 3     Sig: Take 1 Tab by mouth daily. Authorizing Provider: Sara Mercedes ergocalciferol (ERGOCALCIFEROL) 50,000 unit capsule 4 Cap 0     Sig: Take 1 Cap by mouth every seven (7) days. Authorizing Provider: Evonne Harrell     Orders in [facility] for labs in 6 weeks.

## 2018-06-29 NOTE — TELEPHONE ENCOUNTER
Patient called into office to let PCP know that the pharmacy had not received the prescriptions discussed below as of yet.

## 2018-06-29 NOTE — TELEPHONE ENCOUNTER
See telephone note from 6/29/18 for more details. Prescriptions were sent to pharmacy and labs ordered in [facility]. Encounter will be closed.

## 2018-06-29 NOTE — DISCHARGE INSTRUCTIONS
Preventing Falls: Care Instructions  Your Care Instructions    Getting around your home safely can be a challenge if you have injuries or health problems that make it easy for you to fall. Loose rugs and furniture in walkways are among the dangers for many older people who have problems walking or who have poor eyesight. People who have conditions such as arthritis, osteoporosis, or dementia also have to be careful not to fall. You can make your home safer with a few simple measures. Follow-up care is a key part of your treatment and safety. Be sure to make and go to all appointments, and call your doctor if you are having problems. It's also a good idea to know your test results and keep a list of the medicines you take. How can you care for yourself at home? Taking care of yourself  · You may get dizzy if you do not drink enough water. To prevent dehydration, drink plenty of fluids, enough so that your urine is light yellow or clear like water. Choose water and other caffeine-free clear liquids. If you have kidney, heart, or liver disease and have to limit fluids, talk with your doctor before you increase the amount of fluids you drink. · Exercise regularly to improve your strength, muscle tone, and balance. Walk if you can. Swimming may be a good choice if you cannot walk easily. · Have your vision and hearing checked each year or any time you notice a change. If you have trouble seeing and hearing, you might not be able to avoid objects and could lose your balance. · Know the side effects of the medicines you take. Ask your doctor or pharmacist whether the medicines you take can affect your balance. Sleeping pills or sedatives can affect your balance. · Limit the amount of alcohol you drink. Alcohol can impair your balance and other senses. · Ask your doctor whether calluses or corns on your feet need to be removed.  If you wear loose-fitting shoes because of calluses or corns, you can lose your balance and fall. · Talk to your doctor if you have numbness in your feet. Preventing falls at home  · Remove raised doorway thresholds, throw rugs, and clutter. Repair loose carpet or raised areas in the floor. · Move furniture and electrical cords to keep them out of walking paths. · Use nonskid floor wax, and wipe up spills right away, especially on ceramic tile floors. · If you use a walker or cane, put rubber tips on it. If you use crutches, clean the bottoms of them regularly with an abrasive pad, such as steel wool. · Keep your house well lit, especially [place]aud[address], and outside walkways. Use night-lights in areas such as hallways and bathrooms. Add extra light switches or use remote switches (such as switches that go on or off when you clap your hands) to make it easier to turn lights on if you have to get up during the night. · Install sturdy handrails on stairways. · Move items in your cabinets so that the things you use a lot are on the lower shelves (about waist level). · Keep a cordless phone and a flashlight with new batteries by your bed. If possible, put a phone in each of the main rooms of your house, or carry a cell phone in case you fall and cannot reach a phone. Or, you can wear a device around your neck or wrist. You push a button that sends a signal for help. · Wear low-heeled shoes that fit well and give your feet good support. Use footwear with nonskid soles. Check the heels and soles of your shoes for wear. Repair or replace worn heels or soles. · Do not wear socks without shoes on wood floors. · Walk on the grass when the sidewalks are slippery. If you live in an area that gets snow and ice in the winter, sprinkle salt on slippery steps and sidewalks. Preventing falls in the bath  · Install grab bars and nonskid mats inside and outside your shower or tub and near the toilet and sinks. · Use shower chairs and bath benches.   · Use a hand-held shower head that will allow you to sit while showering. · Get into a tub or shower by putting the weaker leg in first. Get out of a tub or shower with your strong side first.  · Repair loose toilet seats and consider installing a raised toilet seat to make getting on and off the toilet easier. · Keep your bathroom door unlocked while you are in the shower. Where can you learn more? Go to http://renato-robert.info/. Enter 0476 [id number] 69 [id number] in the search box to learn more about \"Preventing Falls: Care Instructions. \"  Current as of: May 12, 2017  Content Version: 11.4  © 5920-4570 [employer]. Care instructions adapted under license by [employer] (which disclaims liability or warranty for this information). If you have questions about a medical condition or this instruction, always ask your healthcare professional. [place] [address] any warranty or liability for your use of this information. Learning About a Closed Head Injury  What is a closed head injury? A closed head injury happens when your head gets hit hard. The strong force of the blow causes your brain to shake in your skull. This movement can cause the brain to bruise, swell, or tear. Sometimes nerves or blood vessels also get damaged. This can cause bleeding in or around the brain. A concussion is a type of closed head injury. What are the symptoms? If you have a mild concussion, you may have a mild headache or feel \"not quite right. \" These symptoms are common. They usually go away over a few days to 4 weeks. But sometimes after a concussion, you feel like you can't function as well as before the injury. And you have new symptoms. This is called postconcussive syndrome. You may:  · Find it harder to solve problems, think, concentrate, or remember. · Have headaches. · Have changes in your sleep patterns, such as not being able to sleep or sleeping all the time. · Have changes in your personality.   · Not be interested in your usual activities. · Feel angry or anxious without a clear reason. · Lose your sense of taste or smell. · Be dizzy, lightheaded, or unsteady. It may be hard to stand or walk. How is a closed head injury treated? Any person who may have a concussion needs to see a doctor. Some people have to stay in the hospital to be watched. Others can go home safely. If you go home, follow your doctor's instructions. He or she will tell you if you need someone to watch you closely for the next 24 hours or longer. Rest is the best treatment. Get plenty of sleep at night. And try to rest during the day. · Avoid activities that are physically or mentally demanding. These include housework, exercise, and schoolwork. And don't play video games, send text messages, or use the computer. You may need to change your school or work schedule to be able to avoid these activities. · Ask your doctor when it's okay to drive, ride a bike, or operate machinery. · Take an over-the-counter pain medicine, such as acetaminophen (Tylenol), ibuprofen (Advil, Motrin), or naproxen (Aleve). Be safe with medicines. Read and follow all instructions on the label. · Check with your doctor before you use any other medicines for pain. · Do not drink alcohol or use illegal drugs. They can slow recovery. They can also increase your risk of getting a second head injury. Follow-up care is a key part of your treatment and safety. Be sure to make and go to all appointments, and call your doctor if you are having problems. It's also a good idea to know your test results and keep a list of the medicines you take. Where can you learn more? Go to http://renato-robert.info/. Enter E235 in the search box to learn more about \"Learning About a Closed Head Injury. \"  Current as of: October 14, 2016  Content Version: 11.4  © 9398-1149 [employer].  Care instructions adapted under license by [employer] (which disclaims liability or warranty for this information). If you have questions about a medical condition or this instruction, always ask your healthcare professional. [address] any warranty or liability for your use of this information. Neck Strain: Care Instructions  Your Care Instructions    You have strained the muscles and ligaments in your neck. A sudden, awkward movement can strain the neck. This often occurs with falls or car accidents or during certain sports. Everyday activities like working on a computer or sleeping can also cause neck strain if they force you to hold your neck in an awkward position for a long time. It is common for neck pain to get worse for a day or two after an injury, but it should start to feel better after that. You may have more pain and stiffness for several days before it gets better. This is expected. It may take a few weeks or longer for it to heal completely. Good home treatment can help you get better faster and avoid future neck problems. Follow-up care is a key part of your treatment and safety. Be sure to make and go to all appointments, and call your doctor if you are having problems. It's also a good idea to know your test results and keep a list of the medicines you take. How can you care for yourself at home? · If you were given a neck brace (cervical collar) to limit neck motion, wear it as instructed for as many days as your doctor tells you to. Do not wear it longer than you were told to. Wearing a brace for too long can make neck stiffness worse and weaken the neck muscles. · You can try using heat or ice to see if it helps. ¨ Try using a heating pad on a low or medium setting for 15 to 20 minutes every 2 to 3 hours. Try a warm shower in place of one session with the heating pad. You can also buy single-use heat wraps that last up to 8 hours. ¨ You can also try an ice pack for 10 to 15 minutes every 2 to 3 hours.   · Take pain medicines exactly as directed. ¨ If the doctor gave you a prescription medicine for pain, take it as prescribed. ¨ If you are not taking a prescription pain medicine, ask your doctor if you can take an over-the-counter medicine. · Gently rub the area to relieve pain and help with blood flow. Do not massage the area if it hurts to do so. · Do not do anything that makes the pain worse. Take it easy for a couple of days. You can do your usual activities if they do not hurt your neck or put it at risk for more stress or injury. · Try sleeping on a special neck pillow. Place it under your neck, not under your head. Placing a tightly rolled-up towel under your neck while you sleep will also work. If you use a neck pillow or rolled towel, do not use your regular pillow at the same time. · To prevent future neck pain, do exercises to stretch and strengthen your neck and back. Learn how to use good posture, safe lifting techniques, and proper body mechanics. When should you call for help? Call 911 anytime you think you may need emergency care. For example, call if:  ? · You are unable to move an arm or a leg at all. ?Call your doctor now or seek immediate medical care if:  ? · You have new or worse symptoms in your arms, legs, chest, belly, or buttocks. Symptoms may include:  ¨ Numbness or tingling. ¨ Weakness. ¨ Pain. ? · You lose bladder or bowel control. ? Watch closely for changes in your health, and be sure to contact your doctor if:  ? · You are not getting better as expected. Where can you learn more? Go to http://renato-robert.info/. Enter M253 in the search box to learn more about \"Neck Strain: Care Instructions. \"  Current as of: March 21, 2017  Content Version: 11.4  © 8802-2922 [employer]. Care instructions adapted under license by [employer] (which disclaims liability or warranty for this information).  If you have questions about a medical condition or this instruction, always ask your healthcare professional. [place] [address] any warranty or liability for your use of this information.

## 2018-07-02 ENCOUNTER — TELEPHONE (OUTPATIENT)
Dept: INTERNAL MEDICINE CLINIC | Age: 43
End: 2018-07-02

## 2018-07-02 NOTE — TELEPHONE ENCOUNTER
2 pt. Identifiers confirmed. PT. Notified of below. He states he would like to f/u c psych first and will call back to make the hospital f/u aptmt c Dr. Yoan Wesley. No other questions/concerns at this time.

## 2018-07-02 NOTE — TELEPHONE ENCOUNTER
Please call pt to set up a 30 minute appt. For [facility] ER f/u. Pt  went to [facility] ER on 6/29/18 for fall, head injury and back pain.

## 2018-07-06 NOTE — TELEPHONE ENCOUNTER
Pt needs to contact insurance company and do appeal for medication. If a letter is required PCP office will write letter to assist with getting medication approved.

## 2018-07-12 DIAGNOSIS — R11.2 NAUSEA AND VOMITING, INTRACTABILITY OF VOMITING NOT SPECIFIED, UNSPECIFIED VOMITING TYPE: ICD-10-CM

## 2018-07-16 RX ORDER — ONDANSETRON 4 MG/1
TABLET, ORALLY DISINTEGRATING ORAL
Qty: 30 TAB | Refills: 1 | Status: SHIPPED | OUTPATIENT
Start: 2018-07-16 | End: 2018-07-19 | Stop reason: SDUPTHER

## 2018-07-16 NOTE — TELEPHONE ENCOUNTER
Sent electronically:    Requested Prescriptions     Signed Prescriptions Disp Refills    ondansetron (ZOFRAN ODT) 4 mg disintegrating tablet 30 Tab 1     Sig: dissolve 1 tablet ON TONGUE three times a day if needed nausea     Authorizing Provider: Toña Juan

## 2018-07-19 DIAGNOSIS — R11.2 NAUSEA AND VOMITING, INTRACTABILITY OF VOMITING NOT SPECIFIED, UNSPECIFIED VOMITING TYPE: ICD-10-CM

## 2018-07-19 RX ORDER — ONDANSETRON 4 MG/1
TABLET, ORALLY DISINTEGRATING ORAL
Qty: 90 TAB | Refills: 1 | Status: SHIPPED | OUTPATIENT
Start: 2018-07-19 | End: 2018-08-31 | Stop reason: SDUPTHER

## 2018-07-19 NOTE — TELEPHONE ENCOUNTER
Patient is requesting more than a ten day supply at a time.   Will be out of meds again as of Sunday    Requested Prescriptions     Pending Prescriptions Disp Refills    ondansetron (ZOFRAN ODT) 4 mg disintegrating tablet 30 Tab 1

## 2018-07-23 DIAGNOSIS — M54.50 CHRONIC BILATERAL LOW BACK PAIN WITHOUT SCIATICA: ICD-10-CM

## 2018-07-23 DIAGNOSIS — G89.29 CHRONIC BILATERAL LOW BACK PAIN WITHOUT SCIATICA: ICD-10-CM

## 2018-07-24 RX ORDER — MELOXICAM 7.5 MG/1
TABLET ORAL
Qty: 60 TAB | Refills: 3 | Status: SHIPPED | OUTPATIENT
Start: 2018-07-24 | End: 2019-06-29 | Stop reason: SDUPTHER

## 2018-08-03 DIAGNOSIS — E55.9 VITAMIN D DEFICIENCY: ICD-10-CM

## 2018-08-03 RX ORDER — ERGOCALCIFEROL 1.25 MG/1
CAPSULE ORAL
Qty: 4 CAP | Refills: 0 | OUTPATIENT
Start: 2018-08-03

## 2018-08-03 NOTE — TELEPHONE ENCOUNTER
Denied:    Requested Prescriptions     Refused Prescriptions Disp Refills    VITAMIN D2 50,000 unit capsule [Pharmacy Med Name: VIT D2 1.25 MG (50,000 UNIT)] 4 Cap 0     Sig: take 1 capsule by mouth every week     Refused By: Geetha Hernandez     Reason for Refusal: other     Pt needs vitamin D level checked before refilling.

## 2018-08-15 ENCOUNTER — TELEPHONE (OUTPATIENT)
Dept: INTERNAL MEDICINE CLINIC | Age: 43
End: 2018-08-15

## 2018-08-15 NOTE — TELEPHONE ENCOUNTER
Patient sounded very upset that he got his results from [facility] through [address]  He would like a call back please with the results form test from [facility]

## 2018-08-16 NOTE — TELEPHONE ENCOUNTER
Attempted to contact pt at  number, no answer. Lvm for pt to return call to office at [phone]. Will continue to try to contact pt.

## 2018-08-16 NOTE — TELEPHONE ENCOUNTER
2 pt. Identifiers confirmed. Pt. States he had labs done and that he signed a form to have them released to [facility]. He was informed of below. Pt. states he will call back on Monday to check again. No other questions/concerns at this time.

## 2018-08-16 NOTE — TELEPHONE ENCOUNTER
I am not sure what labs he is referring to. I have not received any labs from [facility]. Usually we can see labs from [facility].

## 2018-08-28 DIAGNOSIS — E78.5 DYSLIPIDEMIA: ICD-10-CM

## 2018-08-28 DIAGNOSIS — E55.9 VITAMIN D DEFICIENCY: ICD-10-CM

## 2018-08-30 DIAGNOSIS — R11.2 NAUSEA AND VOMITING, INTRACTABILITY OF VOMITING NOT SPECIFIED, UNSPECIFIED VOMITING TYPE: ICD-10-CM

## 2018-08-31 RX ORDER — ONDANSETRON 4 MG/1
TABLET, ORALLY DISINTEGRATING ORAL
Qty: 30 TAB | Refills: 1 | Status: SHIPPED | OUTPATIENT
Start: 2018-08-31 | End: 2019-06-11

## 2018-09-05 ENCOUNTER — TELEPHONE (OUTPATIENT)
Dept: INTERNAL MEDICINE CLINIC | Age: 43
End: 2018-09-05

## 2018-09-05 NOTE — TELEPHONE ENCOUNTER
Patient is calling stating he was told a prior Evelia Ordonez is being requested for ondansetron (ZOFRAN ODT) 4 mg disintegrating tablet.  Phone number to start prior auth is [phone]

## 2018-09-11 NOTE — TELEPHONE ENCOUNTER
Your PA has been faxed to the plan as a paper copy. Please contact the plan directly if you haven't received a determination in a typical timeframe. You will be notified of the determination via fax.

## 2018-09-18 NOTE — TELEPHONE ENCOUNTER
PA was denied. PA was denied because it is being used for an indication which is not approved or medically accepted: nausea and vomiting, intractrability of vomiting not specified. Document will be scanned to media.

## 2018-10-08 DIAGNOSIS — E78.5 DYSLIPIDEMIA: ICD-10-CM

## 2018-10-08 RX ORDER — FENOFIBRATE 48 MG/1
TABLET, COATED ORAL
Qty: 30 TAB | Refills: 3 | Status: SHIPPED | OUTPATIENT
Start: 2018-10-08 | End: 2019-05-08 | Stop reason: SDUPTHER

## 2019-02-04 ENCOUNTER — TELEPHONE (OUTPATIENT)
Dept: INTERNAL MEDICINE CLINIC | Age: 44
End: 2019-02-04

## 2019-02-04 DIAGNOSIS — R56.9 SEIZURE (HCC): ICD-10-CM

## 2019-02-04 DIAGNOSIS — F95.9 TIC DISORDER: Primary | ICD-10-CM

## 2019-02-04 NOTE — TELEPHONE ENCOUNTER
Patient is calling in stating his Neurologist is requesting a new referral in order to be seen again,  States he is being seen for his Tic disorder.     [facility]  [address] [place], [address]  [phone]

## 2019-03-26 DIAGNOSIS — E78.5 DYSLIPIDEMIA: ICD-10-CM

## 2019-03-26 RX ORDER — LORATADINE 10 MG/1
TABLET ORAL
Qty: 90 TAB | Refills: 3 | OUTPATIENT
Start: 2019-03-26

## 2019-03-26 RX ORDER — FENOFIBRATE 48 MG/1
TABLET, COATED ORAL
Qty: 30 TAB | Refills: 3 | OUTPATIENT
Start: 2019-03-26

## 2019-03-26 NOTE — TELEPHONE ENCOUNTER
Pt contacted at home number. 2 pt identifiers confirmed. Pt informed of need for appt below. Pt scheduled for follow up Tuesday, April 09, 2019 10:30 AM.  No other questions or concerns at this time.

## 2019-03-26 NOTE — TELEPHONE ENCOUNTER
Denied:    Requested Prescriptions     Refused Prescriptions Disp Refills    fenofibrate nanocrystallized (TRICOR) 48 mg tablet [Pharmacy Med Name: FENOFIBRATE 48 MG TABLET] 30 Tab 3     Sig: take 1 tablet by mouth once daily     Refused By: Dom Gibbs     Reason for Refusal: Appt required, please call patient    loratadine (CLARITIN) 10 mg tablet [Pharmacy Med Name: RA LORATADINE 10 MG TABLET] 90 Tab 3     Sig: take 1 tablet by mouth once daily     Refused By: Dom Gibbs     Reason for Refusal: Appt required, please call patient     Last seen on 6/27/18. Needs OV.

## 2019-04-14 DIAGNOSIS — E78.5 DYSLIPIDEMIA: ICD-10-CM

## 2019-04-15 RX ORDER — FENOFIBRATE 48 MG/1
TABLET, COATED ORAL
Qty: 30 TAB | Refills: 3 | OUTPATIENT
Start: 2019-04-15

## 2019-04-15 RX ORDER — LORATADINE 10 MG/1
TABLET ORAL
Qty: 90 TAB | Refills: 3 | OUTPATIENT
Start: 2019-04-15

## 2019-04-15 NOTE — TELEPHONE ENCOUNTER
Denied:    Requested Prescriptions     Refused Prescriptions Disp Refills    loratadine (CLARITIN) 10 mg tablet [Pharmacy Med Name: RA LORATADINE 10 MG TABLET] 90 Tab 3     Sig: take 1 tablet by mouth once daily     Refused By: Loretta Smart     Reason for Refusal: Appt required, please call patient    fenofibrate nanocrystallized (TRICOR) 48 mg tablet [Pharmacy Med Name: FENOFIBRATE 48 MG TABLET] 30 Tab 3     Sig: take 1 tablet by mouth once daily     Refused By: Loretta Smart     Reason for Refusal: Appt required, please call patient     Pt needs OV. Last seen on 6/2018.

## 2019-04-15 NOTE — TELEPHONE ENCOUNTER
Attempted to contact patient at  number, no answer. Left voice mail with patient that appointment is required for refill. [employer] message also sent with denial of medication. Per Dr. Thurston, all meds to be given through G tube. NO MEDS per J tube. Tube feedings may be restarted through J tube today at trickle rate. MD to reassess tomorrow.

## 2019-05-02 ENCOUNTER — PATIENT MESSAGE (OUTPATIENT)
Dept: INTERNAL MEDICINE CLINIC | Age: 44
End: 2019-05-02

## 2019-05-02 ENCOUNTER — OFFICE VISIT (OUTPATIENT)
Dept: INTERNAL MEDICINE CLINIC | Age: 44
End: 2019-05-02

## 2019-05-02 VITALS
HEIGHT: 72 IN | DIASTOLIC BLOOD PRESSURE: 82 MMHG | BODY MASS INDEX: 42.66 KG/M2 | OXYGEN SATURATION: 97 % | TEMPERATURE: 98.5 F | WEIGHT: 315 LBS | RESPIRATION RATE: 16 BRPM | HEART RATE: 96 BPM | SYSTOLIC BLOOD PRESSURE: 140 MMHG

## 2019-05-02 DIAGNOSIS — I10 BENIGN HYPERTENSION WITHOUT CHF: ICD-10-CM

## 2019-05-02 DIAGNOSIS — Z13.1 DIABETES MELLITUS SCREENING: ICD-10-CM

## 2019-05-02 DIAGNOSIS — E78.5 DYSLIPIDEMIA: ICD-10-CM

## 2019-05-02 DIAGNOSIS — E55.9 VITAMIN D DEFICIENCY: ICD-10-CM

## 2019-05-02 DIAGNOSIS — Z02.83 ENCOUNTER FOR DRUG SCREENING: ICD-10-CM

## 2019-05-02 DIAGNOSIS — R63.5 WEIGHT GAIN: Primary | ICD-10-CM

## 2019-05-02 RX ORDER — CHOLECALCIFEROL TAB 125 MCG (5000 UNIT) 125 MCG
5000 TAB ORAL DAILY
COMMUNITY
End: 2019-06-11 | Stop reason: DRUGHIGH

## 2019-05-02 RX ORDER — AMLODIPINE BESYLATE 5 MG/1
5 TABLET ORAL DAILY
Qty: 30 TAB | Refills: 3 | Status: SHIPPED | OUTPATIENT
Start: 2019-05-02 | End: 2019-07-10 | Stop reason: SDUPTHER

## 2019-05-02 RX ORDER — OMEPRAZOLE 20 MG/1
20 CAPSULE, DELAYED RELEASE ORAL DAILY
COMMUNITY
End: 2019-06-11 | Stop reason: DRUGHIGH

## 2019-05-02 RX ORDER — LOSARTAN POTASSIUM 100 MG/1
TABLET ORAL
Qty: 90 TAB | Refills: 3 | Status: SHIPPED | OUTPATIENT
Start: 2019-05-02 | End: 2019-07-10 | Stop reason: SDUPTHER
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
## 2019-05-02 NOTE — PROGRESS NOTES
ROOM # 1  Identified pt with two pt identifiers(name and ). Reviewed record in preparation for visit and have obtained necessary documentation. Chief Complaint   Patient presents with    Medication Refill      Aye Wellington preferred language for health care discussion is english/other. Is the patient using any DME equipment during OV? NO    Aye Wellington is due for: There are no preventive care reminders to display for this patient. Health Maintenance reviewed and discussed per provider  Please order/place referral if appropriate. Advance Directive:  1. Do you have an advance directive in place? Patient Reply: NO    2. If not, would you like material regarding how to put one in place? NO    Coordination of Care:  1. Have you been to the ER, urgent care clinic since your last visit? Hospitalized since your last visit? NO    2. Have you seen or consulted any other health care providers outside of the [address] [place] [address] since your last visit? Include any pap smears or colon screening. NO    Patient is accompanied by self I have received verbal consent from Aye Wellington to discuss any/all medical information while they are present in the room. Learning Assessment:  Learning Assessment 3/6/2017   PRIMARY LEARNER Patient   HIGHEST LEVEL OF EDUCATION - PRIMARY LEARNER  2 YEARS OF COLLEGE   BARRIERS PRIMARY LEARNER COGNITIVE   CO-LEARNER CAREGIVER No   PRIMARY LANGUAGE ENGLISH   LEARNER PREFERENCE PRIMARY LISTENING     VIDEOS     READING   ANSWERED BY pateint   RELATIONSHIP SELF     Depression Screening:  3 most recent PHQ Screens 3/6/2017   PHQ Not Done Active Diagnosis of Depression or Bipolar Disorder   Little interest or pleasure in doing things Nearly every day   Feeling down, depressed, irritable, or hopeless Nearly every day   Total Score PHQ 2 6     Abuse Screening:  Abuse Screening Questionnaire 2018   Do you ever feel afraid of your partner?  N   Are you in a relationship with someone who physically or mentally threatens you? N   Is it safe for you to go home?  Y     Fall Risk  n/i

## 2019-05-02 NOTE — PATIENT INSTRUCTIONS
1) Follow-up in 1 months or sooner if worsening symptoms. 2) In addition to the cozaar, we will add norvasc 5mg one po daily. 3) Monitor your blood pressure and let us  Know if too high or too low.

## 2019-05-04 RX ORDER — OXCARBAZEPINE 300 MG/1
300 TABLET, FILM COATED ORAL 2 TIMES DAILY
COMMUNITY
End: 2019-06-11 | Stop reason: DRUGHIGH

## 2019-05-04 RX ORDER — PERPHENAZINE 8 MG/1
8 TABLET, FILM COATED ORAL 2 TIMES DAILY
COMMUNITY
End: 2019-06-11 | Stop reason: DRUGHIGH

## 2019-05-04 NOTE — TELEPHONE ENCOUNTER
From: Wyatt Gonzalez  To:  Geo Stewart MD  Sent: 5/2/2019 12:21 PM EDT  Subject: Update Medical Information    Valium 5mg 3 x day  Oxcarbazepine 300mg 2 x day  Perphenazine 8mg 2x a day 16mg   1 time day

## 2019-05-05 LAB
25(OH)D3+25(OH)D2 SERPL-MCNC: 32.4 NG/ML (ref 30–100)
ALBUMIN SERPL-MCNC: 4.9 G/DL (ref 3.5–5.5)
ALBUMIN/GLOB SERPL: 2.2 {RATIO} (ref 1.2–2.2)
ALP SERPL-CCNC: 104 IU/L (ref 39–117)
ALT SERPL-CCNC: 85 IU/L (ref 0–44)
APPEARANCE UR: CLEAR
AST SERPL-CCNC: 48 IU/L (ref 0–40)
BILIRUB SERPL-MCNC: 0.8 MG/DL (ref 0–1.2)
BILIRUB UR QL STRIP: NEGATIVE
BUN SERPL-MCNC: 8 MG/DL (ref 6–24)
BUN/CREAT SERPL: 9 (ref 9–20)
CALCIUM SERPL-MCNC: 9.5 MG/DL (ref 8.7–10.2)
CHLORIDE SERPL-SCNC: 100 MMOL/L (ref 96–106)
CHOLEST SERPL-MCNC: 225 MG/DL (ref 100–199)
CO2 SERPL-SCNC: 22 MMOL/L (ref 20–29)
COLOR UR: YELLOW
CREAT SERPL-MCNC: 0.89 MG/DL (ref 0.76–1.27)
ERYTHROCYTE [DISTWIDTH] IN BLOOD BY AUTOMATED COUNT: 13.6 % (ref 12.3–15.4)
EST. AVERAGE GLUCOSE BLD GHB EST-MCNC: 117 MG/DL
GLOBULIN SER CALC-MCNC: 2.2 G/DL (ref 1.5–4.5)
GLUCOSE SERPL-MCNC: 79 MG/DL (ref 65–99)
GLUCOSE UR QL: NEGATIVE
HBA1C MFR BLD: 5.7 % (ref 4.8–5.6)
HCT VFR BLD AUTO: 42.6 % (ref 37.5–51)
HDLC SERPL-MCNC: 29 MG/DL
HGB BLD-MCNC: 14 G/DL (ref 13–17.7)
HGB UR QL STRIP: NEGATIVE
INTERPRETATION, 910389: NORMAL
KETONES UR QL STRIP: NEGATIVE
LDLC SERPL CALC-MCNC: 147 MG/DL (ref 0–99)
LEUKOCYTE ESTERASE UR QL STRIP: NEGATIVE
MCH RBC QN AUTO: 28.1 PG (ref 26.6–33)
MCHC RBC AUTO-ENTMCNC: 32.9 G/DL (ref 31.5–35.7)
MCV RBC AUTO: 86 FL (ref 79–97)
MICRO URNS: NORMAL
NITRITE UR QL STRIP: NEGATIVE
PH UR STRIP: 6.5 [PH] (ref 5–7.5)
PLATELET # BLD AUTO: 345 X10E3/UL (ref 150–379)
POTASSIUM SERPL-SCNC: 3.8 MMOL/L (ref 3.5–5.2)
PROT SERPL-MCNC: 7.1 G/DL (ref 6–8.5)
PROT UR QL STRIP: NEGATIVE
RBC # BLD AUTO: 4.98 X10E6/UL (ref 4.14–5.8)
SODIUM SERPL-SCNC: 143 MMOL/L (ref 134–144)
SP GR UR: 1 (ref 1–1.03)
TRIGL SERPL-MCNC: 245 MG/DL (ref 0–149)
TSH SERPL DL<=0.005 MIU/L-ACNC: 4.44 UIU/ML (ref 0.45–4.5)
UROBILINOGEN UR STRIP-MCNC: 0.2 MG/DL (ref 0.2–1)
VLDLC SERPL CALC-MCNC: 49 MG/DL (ref 5–40)
WBC # BLD AUTO: 8 X10E3/UL (ref 3.4–10.8)

## 2019-05-06 RX ORDER — LORATADINE 10 MG/1
TABLET ORAL
Qty: 90 TAB | Refills: 3 | Status: SHIPPED | OUTPATIENT
Start: 2019-05-06 | End: 2019-07-10 | Stop reason: SDUPTHER

## 2019-05-07 NOTE — PROGRESS NOTES
Please let pt know that labs were normal except:    1) lipids are high. Increase tricor 48mg 2 daily. Work on diet and exercise. 2) LFT's are up. Is he taking a lot of tylenol, ETOH, herbals, IVDU, tattoos? If so, stop. Will need to recheck levels at 5/30/19 OV. 3) HgA1C is high at 5.7. He needs to work on diet and exercise. This could be considered prediabetic. 4) Mail low cholesterol and DM diet information to pt.

## 2019-05-08 DIAGNOSIS — E78.5 DYSLIPIDEMIA: ICD-10-CM

## 2019-05-08 RX ORDER — FENOFIBRATE 48 MG/1
TABLET, COATED ORAL
Qty: 90 TAB | Refills: 3 | Status: SHIPPED | OUTPATIENT
Start: 2019-05-08 | End: 2019-07-10 | Stop reason: SDUPTHER

## 2019-05-10 LAB
DRUGS UR: NORMAL
SPECIMEN STATUS REPORT, ROLRST: NORMAL

## 2019-05-13 NOTE — PROGRESS NOTES
Please let pt know that UDS showed:    1) metabolites of valium which is expected. 2) neurontin is present. Does he take gabapentin? 3) trileptal and it's metabolite which is expected.

## 2019-05-21 NOTE — PROGRESS NOTES
Spoke with patient and 2 patient identifiers was confirmed. Patient is taking gabapentin. Patient is also taking clonazepam now.

## 2019-06-11 ENCOUNTER — OFFICE VISIT (OUTPATIENT)
Dept: INTERNAL MEDICINE CLINIC | Age: 44
End: 2019-06-11

## 2019-06-11 VITALS
BODY MASS INDEX: 42.66 KG/M2 | RESPIRATION RATE: 17 BRPM | TEMPERATURE: 97.9 F | WEIGHT: 315 LBS | DIASTOLIC BLOOD PRESSURE: 84 MMHG | OXYGEN SATURATION: 93 % | HEIGHT: 72 IN | SYSTOLIC BLOOD PRESSURE: 124 MMHG | HEART RATE: 95 BPM

## 2019-06-11 DIAGNOSIS — Z00.00 MEDICARE ANNUAL WELLNESS VISIT, SUBSEQUENT: Primary | ICD-10-CM

## 2019-06-11 DIAGNOSIS — I10 BENIGN HYPERTENSION WITHOUT CHF: ICD-10-CM

## 2019-06-11 RX ORDER — OMEPRAZOLE 40 MG/1
CAPSULE, DELAYED RELEASE ORAL
Refills: 0 | COMMUNITY
Start: 2019-05-03 | End: 2019-07-10 | Stop reason: SDUPTHER

## 2019-06-11 RX ORDER — CEPHRADINE 500 MG
1 CAPSULE ORAL DAILY
COMMUNITY
End: 2020-12-01

## 2019-06-11 RX ORDER — OXCARBAZEPINE 600 MG/1
TABLET, FILM COATED ORAL
Refills: 0 | COMMUNITY
Start: 2019-06-07

## 2019-06-11 RX ORDER — PERPHENAZINE 16 MG/1
1 TABLET, FILM COATED ORAL 3 TIMES DAILY
Refills: 0 | COMMUNITY
Start: 2019-06-05
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
## 2019-06-11 NOTE — PATIENT INSTRUCTIONS
Medicare Wellness Visit, Male    The best way to improve and maintain good health is to have a healthy lifestyle by eating a well-balanced diet, exercising regularly, limiting alcohol and stopping smoking. Regular physical exams and screening tests are another way to keep healthy. Preventive exams provided by your health care provider can find health problems before they become diseases or illnesses. Preventive services including immunizations, screening tests, monitoring and exams can help you take care of your own health. Preventive services such as immunizations prevent serious infections. All people over age 72 should have a Pneumovax and a Prevnar-13 shot to prevent potentially life threatening infections with the pneumococcus bacteria, a common cause of pneumonia. These are once in a lifetime unless you and your provider decide differently. Next due: not indicated until age 72 years    All people over 72 should have a yearly influenza vaccine or \"flu\" shot. This does not prevent infection with cold viruses but has been proven to prevent hospitalization and death from influenza. Next due: this Fall    Although Medicare part B \"regular Medicare\" currently only covers tetanus vaccination in the context of an injury, a tetanus vaccine (Tdap or Td) is recommended every 10 years. Tdap is generally given once in a lifetime for older adults. Next due: Td in 2027    A shingles vaccine is recommended as you get older. Zostavax is a once in a lifetime vaccine given over age 61years of age. There is also a new shingles vaccine, Shingrix, that is now preferred over Zostavax. Shingrix (a 2-dose series) is recommended if you are over age 48years of age and you've never received a shingles vaccine. It is also recommended for revaccination if you've previously received Zostavax. The Shingles vaccines are not covered by Medicare part B.  Next due: not indicated until age 48 years    Note, however, that both the Shingles vaccine and Tdap/Td are generally covered by secondary carriers. Please check your coverage and out of pocket expenses. Your pharmacy benefits may cover these vaccines so please check with your pharmacist.  Also consider contacting your local health department because it may stock these vaccines for a reasonable charge. We currently have documentation of the following immunization history for you:  Immunization History   Administered Date(s) Administered    Influenza Vaccine 10/04/2016    TD Vaccine 04/12/2004    Tdap 03/06/2017       Screening for diabetes mellitus with a blood sugar test (glucose) should be done every year. If you have diabetes, this monitoring will be done more frequently (usually every 3-6 months) and will include A1C testing. The most recent blood glucose we have on file for you is:   Lab Results   Component Value Date/Time    Glucose 79 05/02/2019 11:08 AM     Lab Results   Component Value Date/Time    Hemoglobin A1c 5.7 (H) 05/02/2019 11:08 AM    Hemoglobin A1c 5.3 06/27/2018 09:31 AM    Hemoglobin A1c 5.2 01/29/2018 12:42 PM       Glaucoma is a disease of the eye due to increased ocular pressure that can lead to blindness. People with risk factors for glaucoma ( race,  American race, diabetes, family history) should be screened every year by an eye professional.   Last done: 2017  Next due: every 2 years for new glasses prescription    Cardiovascular screening tests that check for elevated lipids or cholesterol (fatty part of blood) which can lead to heart disease and strokes should be done every 5 years.   The most recent lipid panel we have on file for you is:   Lab Results   Component Value Date/Time    Cholesterol, total 225 (H) 05/02/2019 11:08 AM    HDL Cholesterol 29 (L) 05/02/2019 11:08 AM    LDL,Direct 152 (H) 01/29/2018 12:42 PM    LDL, calculated 147 (H) 05/02/2019 11:08 AM    VLDL, calculated 49 (H) 05/02/2019 11:08 AM    Triglyceride 245 (H) 05/02/2019 11:08 AM     Next due: annually while on medication for cholesterol    Colorectal cancer screening that evaluates for blood or polyps in your colon for people with average risk should be done yearly as a stool test, every five years as a flexible sigmoidoscope or every 10 years as a colonoscopy up to age 76. You and your health care provider may decide whether to continue screening after age 76 or if you need to be screened more frequently. Not routinely indicated until age 48 years    Men up to age 76 may elect to screen for prostate cancer with a blood test called a PSA at certain intervals, depending on your personal and family history. This decision is between you and your provider. The most recent PSA values we have on file for you are:  No results found for: PSA, Daja Burrows, Cornelia Umanzor, [id number], [id number], PSALT    Your Medicare Wellness Exam is recommended annually. If you do not have Advanced Directives on file with our office and you either have the completed document at home or you fill out the forms provided, please bring a copy to the office to be scanned into your record.     Here is a list of your current Health Maintenance items with a due date:  Health Maintenance Due   Topic Date Due    Annual Well Visit  06/28/2019

## 2019-06-11 NOTE — ACP (ADVANCE CARE PLANNING)
Non-Provider Advance Care Planning (ACP) Note    Date of ACP Conversation: 6/11/2019  Persons included in Conversation: patient  Length of ACP Conversation in minutes: <16 minutes (Non-Billable)    Conversation requested by: Other: included in Medicare [address] (if patient is incapable of making informed decisions): This person is:  Healthcare Agent/Medical Power of  under Advance Directive  Patient reports having document at home      General ACP for ALL Patients with Decision Making Capacity:      Review of Existing Advance Directive: (Select questions covered)  Does this advance directive still reflect your preferences?   Yes    Interventions Provided:  Recommended communicating the plan and providing copies for the healthcare agent, personal physician, and others as appropriate   Recommended review of completed ACP document annually or upon change in health status

## 2019-06-11 NOTE — PROGRESS NOTES
ROOM # 2  Identified pt with two pt identifiers(name and ). Reviewed record in preparation for visit and have obtained necessary documentation. Chief Complaint   Patient presents with    Hypertension     1 month fu       Yahaira Messina preferred language for health care discussion is english/other. Is the patient using any DME equipment during OV? Rupert Rincon is due for:  Health Maintenance Due   Topic    [address] Maintenance reviewed and discussed per provider  Please order/place referral if appropriate. Advance Directive:  1. Do you have an advance directive in place? Patient Reply: NO    2. If not, would you like material regarding how to put one in place? NO    Coordination of Care:  1. Have you been to the ER, urgent care clinic since your last visit? Hospitalized since your last visit? NO    2. Have you seen or consulted any other health care providers outside of the [address] [place] [address] since your last visit? Include any pap smears or colon screening. NO    Patient is accompanied by self I have received verbal consent from Yahaira Messina to discuss any/all medical information while they are present in the room. Learning Assessment:  Learning Assessment 3/6/2017   PRIMARY LEARNER Patient   HIGHEST LEVEL OF EDUCATION - PRIMARY LEARNER  2 YEARS OF COLLEGE   BARRIERS PRIMARY LEARNER COGNITIVE   CO-LEARNER CAREGIVER No   PRIMARY LANGUAGE ENGLISH   LEARNER PREFERENCE PRIMARY LISTENING     VIDEOS     READING   ANSWERED BY pateint   RELATIONSHIP SELF     Depression Screening:  3 most recent PHQ Screens 3/6/2017   PHQ Not Done Active Diagnosis of Depression or Bipolar Disorder   Little interest or pleasure in doing things Nearly every day   Feeling down, depressed, irritable, or hopeless Nearly every day   Total Score PHQ 2 6     Abuse Screening:  Abuse Screening Questionnaire 2018   Do you ever feel afraid of your partner?  N   Are you in a relationship with someone who physically or mentally threatens you? N   Is it safe for you to go home?  Y     Fall Risk  n/i
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
## 2019-06-17 ENCOUNTER — TELEPHONE (OUTPATIENT)
Dept: INTERNAL MEDICINE CLINIC | Age: 44
End: 2019-06-17

## 2019-06-17 DIAGNOSIS — E66.9 OBESITY, UNSPECIFIED CLASSIFICATION, UNSPECIFIED OBESITY TYPE, UNSPECIFIED WHETHER SERIOUS COMORBIDITY PRESENT: ICD-10-CM

## 2019-06-17 DIAGNOSIS — Z76.89 ENCOUNTER FOR WEIGHT MANAGEMENT: Primary | ICD-10-CM

## 2019-06-19 ENCOUNTER — PATIENT MESSAGE (OUTPATIENT)
Dept: INTERNAL MEDICINE CLINIC | Age: 44
End: 2019-06-19

## 2019-06-26 ENCOUNTER — TELEPHONE (OUTPATIENT)
Dept: INTERNAL MEDICINE CLINIC | Age: 44
End: 2019-06-26

## 2019-06-26 NOTE — TELEPHONE ENCOUNTER
Prior Auth completed for patient medication Belviq. Prior auth faxed to [employer]. Prior Elner Retort has been denied. Patient needs to have had an EKG in the last 6 months. Could not locate a recent EKG. Please advise.

## 2019-06-27 NOTE — TELEPHONE ENCOUNTER
Attempted to contact patient at  number, no answer. Patient mailbox is full, not able to leave a message at this time. Will continue to try to contact patient.

## 2019-06-29 DIAGNOSIS — G89.29 CHRONIC BILATERAL LOW BACK PAIN WITHOUT SCIATICA: ICD-10-CM

## 2019-06-29 DIAGNOSIS — M54.50 CHRONIC BILATERAL LOW BACK PAIN WITHOUT SCIATICA: ICD-10-CM

## 2019-07-01 RX ORDER — MELOXICAM 7.5 MG/1
TABLET ORAL
Qty: 180 TAB | Refills: 3 | Status: SHIPPED | OUTPATIENT
Start: 2019-07-01 | End: 2019-07-10 | Stop reason: SDUPTHER

## 2019-07-01 NOTE — TELEPHONE ENCOUNTER
Attempted to contact patient at  number, no answer. Patient mailbox is full. Not able to leave a message. [employer] message sent to patient. Encounter will be closed.

## 2019-07-10 DIAGNOSIS — E78.5 DYSLIPIDEMIA: ICD-10-CM

## 2019-07-10 DIAGNOSIS — G89.29 CHRONIC BILATERAL LOW BACK PAIN WITHOUT SCIATICA: ICD-10-CM

## 2019-07-10 DIAGNOSIS — I10 BENIGN HYPERTENSION WITHOUT CHF: ICD-10-CM

## 2019-07-10 DIAGNOSIS — M54.50 CHRONIC BILATERAL LOW BACK PAIN WITHOUT SCIATICA: ICD-10-CM

## 2019-07-10 RX ORDER — MELOXICAM 7.5 MG/1
TABLET ORAL
Qty: 180 TAB | Refills: 3 | Status: SHIPPED | OUTPATIENT
Start: 2019-07-10 | End: 2020-12-01

## 2019-07-10 RX ORDER — OMEPRAZOLE 40 MG/1
CAPSULE, DELAYED RELEASE ORAL
Qty: 90 CAP | Refills: 3 | Status: SHIPPED | OUTPATIENT
Start: 2019-07-10

## 2019-07-10 RX ORDER — FENOFIBRATE 48 MG/1
TABLET, COATED ORAL
Qty: 90 TAB | Refills: 3 | Status: SHIPPED | OUTPATIENT
Start: 2019-07-10

## 2019-07-10 RX ORDER — LOSARTAN POTASSIUM 100 MG/1
TABLET ORAL
Qty: 90 TAB | Refills: 3 | Status: SHIPPED | OUTPATIENT
Start: 2019-07-10

## 2019-07-10 RX ORDER — AMLODIPINE BESYLATE 5 MG/1
5 TABLET ORAL DAILY
Qty: 90 TAB | Refills: 3 | Status: SHIPPED | OUTPATIENT
Start: 2019-07-10

## 2019-07-10 RX ORDER — LORATADINE 10 MG/1
TABLET ORAL
Qty: 90 TAB | Refills: 3 | Status: SHIPPED | OUTPATIENT
Start: 2019-07-10

## 2019-07-10 NOTE — TELEPHONE ENCOUNTER
Requested Prescriptions     Pending Prescriptions Disp Refills    amLODIPine (NORVASC) 5 mg tablet 30 Tab 3     Sig: Take 1 Tab by mouth daily.     fenofibrate nanocrystallized (TRICOR) 48 mg tablet 90 Tab 3     Sig: take 1 tablet by mouth once daily    loratadine (CLARITIN) 10 mg tablet 90 Tab 3    losartan (COZAAR) 100 mg tablet 90 Tab 3     Sig: take 1 tablet by mouth once daily    meloxicam (MOBIC) 7.5 mg tablet 180 Tab 3     Sig: Take 1-2 po daily prn pain    omeprazole (PRILOSEC) 40 mg capsule  0

## 2019-08-20 ENCOUNTER — TELEPHONE (OUTPATIENT)
Dept: INTERNAL MEDICINE CLINIC | Age: 44
End: 2019-08-20

## 2019-08-20 NOTE — TELEPHONE ENCOUNTER
Got paged regarding this pt at 5:01 Am today,  8/20/19. Called pt at 5:03AM, today. He said he took his cozaar 100mg last night at 9:30pm and forgot he took it last night and took another 100mg cozaar 30 minutes before I called him. He said he feels okay and is unable to check his blood pressure. He currently denies any chest pain, shortness of breath, headaches, dizziness, blurred vision. Due to the fact he cannot recheck his blood pressure, I told him he needs to go to ER to be further evaluated. He said he will go to [facility] ER. Pt verbalized understanding.

## 2019-10-11 DIAGNOSIS — I10 BENIGN HYPERTENSION WITHOUT CHF: ICD-10-CM

## 2019-10-11 RX ORDER — LOSARTAN POTASSIUM 100 MG/1
TABLET ORAL
Qty: 90 TAB | Refills: 3 | Status: SHIPPED | OUTPATIENT
Start: 2019-10-11 | End: 2020-12-01

## 2020-04-15 NOTE — TELEPHONE ENCOUNTER
Denied:    Requested Prescriptions     Refused Prescriptions Disp Refills    loratadine (CLARITIN) 10 mg tablet 90 Tab 3     Sig: Take 1 Tab by mouth daily. Refused By: Caroline Jackson     Reason for Refusal: other     See below. 71 yo white male with MRDD sent here for weakness and ALOC. Exam revealed MRDD, minimally verbal male in NAD with coarse BS, tachycardic heart exam and non focal neuro exam. I agree with plan and management outlined by PA.

## 2020-11-05 ENCOUNTER — ANESTHESIA (OUTPATIENT)
Dept: SURGERY | Age: 45
End: 2020-11-05
Payer: MEDICAID

## 2020-11-05 ENCOUNTER — APPOINTMENT (OUTPATIENT)
Dept: CT IMAGING | Age: 45
End: 2020-11-05
Attending: PHYSICIAN ASSISTANT
Payer: MEDICAID

## 2020-11-05 ENCOUNTER — APPOINTMENT (OUTPATIENT)
Dept: GENERAL RADIOLOGY | Age: 45
End: 2020-11-05
Attending: UROLOGY
Payer: MEDICAID

## 2020-11-05 ENCOUNTER — HOSPITAL ENCOUNTER (EMERGENCY)
Age: 45
Discharge: HOME | End: 2020-11-05
Attending: STUDENT IN AN ORGANIZED HEALTH CARE EDUCATION/TRAINING PROGRAM | Admitting: EMERGENCY MEDICINE
Payer: MEDICAID

## 2020-11-05 ENCOUNTER — ANESTHESIA EVENT (OUTPATIENT)
Dept: SURGERY | Age: 45
End: 2020-11-05
Payer: MEDICAID

## 2020-11-05 VITALS
BODY MASS INDEX: 40.63 KG/M2 | DIASTOLIC BLOOD PRESSURE: 74 MMHG | SYSTOLIC BLOOD PRESSURE: 136 MMHG | HEIGHT: 72 IN | OXYGEN SATURATION: 99 % | HEART RATE: 86 BPM | RESPIRATION RATE: 15 BRPM | TEMPERATURE: 98.2 F | WEIGHT: 300 LBS

## 2020-11-05 DIAGNOSIS — N13.2 HYDRONEPHROSIS WITH URINARY OBSTRUCTION DUE TO URETERAL CALCULUS: Primary | ICD-10-CM

## 2020-11-05 LAB
ALBUMIN SERPL-MCNC: 4.4 G/DL (ref 3.4–5)
ALBUMIN/GLOB SERPL: 1.5 {RATIO} (ref 0.8–1.7)
ALP SERPL-CCNC: 116 U/L (ref 45–117)
ALT SERPL-CCNC: 37 U/L (ref 16–61)
ANION GAP SERPL CALC-SCNC: 9 MMOL/L (ref 3–18)
APPEARANCE UR: CLEAR
AST SERPL-CCNC: 15 U/L (ref 10–38)
BASOPHILS # BLD: 0 K/UL (ref 0–0.1)
BASOPHILS NFR BLD: 0 % (ref 0–2)
BILIRUB SERPL-MCNC: 0.7 MG/DL (ref 0.2–1)
BILIRUB UR QL: NEGATIVE
BUN SERPL-MCNC: 11 MG/DL (ref 7–18)
BUN/CREAT SERPL: 7 (ref 12–20)
CALCIUM SERPL-MCNC: 9.7 MG/DL (ref 8.5–10.1)
CHLORIDE SERPL-SCNC: 104 MMOL/L (ref 100–111)
CO2 SERPL-SCNC: 26 MMOL/L (ref 21–32)
COLOR UR: YELLOW
COVID-19 RAPID TEST, COVR: NOT DETECTED
CREAT SERPL-MCNC: 1.67 MG/DL (ref 0.6–1.3)
DIFFERENTIAL METHOD BLD: ABNORMAL
EOSINOPHIL # BLD: 0.1 K/UL (ref 0–0.4)
EOSINOPHIL NFR BLD: 1 % (ref 0–5)
ERYTHROCYTE [DISTWIDTH] IN BLOOD BY AUTOMATED COUNT: 12.6 % (ref 11.6–14.5)
GLOBULIN SER CALC-MCNC: 3 G/DL (ref 2–4)
GLUCOSE SERPL-MCNC: 118 MG/DL (ref 74–99)
GLUCOSE UR STRIP.AUTO-MCNC: NEGATIVE MG/DL
HCT VFR BLD AUTO: 41.7 % (ref 36–48)
HEALTH STATUS, XMCV2T: NORMAL
HGB BLD-MCNC: 14.1 G/DL (ref 13–16)
HGB UR QL STRIP: NEGATIVE
KETONES UR QL STRIP.AUTO: ABNORMAL MG/DL
LEUKOCYTE ESTERASE UR QL STRIP.AUTO: NEGATIVE
LYMPHOCYTES # BLD: 1.4 K/UL (ref 0.9–3.6)
LYMPHOCYTES NFR BLD: 11 % (ref 21–52)
MCH RBC QN AUTO: 28.8 PG (ref 24–34)
MCHC RBC AUTO-ENTMCNC: 33.8 G/DL (ref 31–37)
MCV RBC AUTO: 85.1 FL (ref 74–97)
MONOCYTES # BLD: 1.1 K/UL (ref 0.05–1.2)
MONOCYTES NFR BLD: 9 % (ref 3–10)
NEUTS SEG # BLD: 10.3 K/UL (ref 1.8–8)
NEUTS SEG NFR BLD: 79 % (ref 40–73)
NITRITE UR QL STRIP.AUTO: NEGATIVE
PH UR STRIP: 6.5 [PH] (ref 5–8)
PLATELET # BLD AUTO: 416 K/UL (ref 135–420)
PMV BLD AUTO: 10.3 FL (ref 9.2–11.8)
POTASSIUM SERPL-SCNC: 3.9 MMOL/L (ref 3.5–5.5)
PROT SERPL-MCNC: 7.4 G/DL (ref 6.4–8.2)
PROT UR STRIP-MCNC: NEGATIVE MG/DL
RBC # BLD AUTO: 4.9 M/UL (ref 4.7–5.5)
SODIUM SERPL-SCNC: 139 MMOL/L (ref 136–145)
SOURCE, COVRS: NORMAL
SP GR UR REFRACTOMETRY: 1.02 (ref 1–1.03)
SPECIMEN TYPE, XMCV1T: NORMAL
UROBILINOGEN UR QL STRIP.AUTO: 1 EU/DL (ref 0.2–1)
WBC # BLD AUTO: 12.9 K/UL (ref 4.6–13.2)

## 2020-11-05 PROCEDURE — 74011000258 HC RX REV CODE- 258: Performed by: NURSE ANESTHETIST, CERTIFIED REGISTERED

## 2020-11-05 PROCEDURE — 80053 COMPREHEN METABOLIC PANEL: CPT

## 2020-11-05 PROCEDURE — 00910 ANES TRANSURETHRAL PX NOS: CPT | Performed by: NURSE ANESTHETIST, CERTIFIED REGISTERED

## 2020-11-05 PROCEDURE — 00910 ANES TRANSURETHRAL PX NOS: CPT | Performed by: ANESTHESIOLOGY

## 2020-11-05 PROCEDURE — 81003 URINALYSIS AUTO W/O SCOPE: CPT

## 2020-11-05 PROCEDURE — 76010000154 HC OR TIME FIRST 0.5 HR: Performed by: UROLOGY

## 2020-11-05 PROCEDURE — 74011000636 HC RX REV CODE- 636: Performed by: UROLOGY

## 2020-11-05 PROCEDURE — 96374 THER/PROPH/DIAG INJ IV PUSH: CPT

## 2020-11-05 PROCEDURE — 77030012510 HC MSK AIRWY LMA TELE -B: Performed by: ANESTHESIOLOGY

## 2020-11-05 PROCEDURE — 99285 EMERGENCY DEPT VISIT HI MDM: CPT

## 2020-11-05 PROCEDURE — 74176 CT ABD & PELVIS W/O CONTRAST: CPT

## 2020-11-05 PROCEDURE — 74011250636 HC RX REV CODE- 250/636: Performed by: NURSE ANESTHETIST, CERTIFIED REGISTERED

## 2020-11-05 PROCEDURE — 76210000006 HC OR PH I REC 0.5 TO 1 HR: Performed by: UROLOGY

## 2020-11-05 PROCEDURE — 74011000250 HC RX REV CODE- 250: Performed by: NURSE ANESTHETIST, CERTIFIED REGISTERED

## 2020-11-05 PROCEDURE — 96375 TX/PRO/DX INJ NEW DRUG ADDON: CPT

## 2020-11-05 PROCEDURE — 85025 COMPLETE CBC W/AUTO DIFF WBC: CPT

## 2020-11-05 PROCEDURE — 99140 ANES COMP EMERGENCY COND: CPT | Performed by: NURSE ANESTHETIST, CERTIFIED REGISTERED

## 2020-11-05 PROCEDURE — 87635 SARS-COV-2 COVID-19 AMP PRB: CPT

## 2020-11-05 PROCEDURE — C2617 STENT, NON-COR, TEM W/O DEL: HCPCS | Performed by: UROLOGY

## 2020-11-05 PROCEDURE — 96376 TX/PRO/DX INJ SAME DRUG ADON: CPT

## 2020-11-05 PROCEDURE — 99140 ANES COMP EMERGENCY COND: CPT | Performed by: ANESTHESIOLOGY

## 2020-11-05 PROCEDURE — 76060000031 HC ANESTHESIA FIRST 0.5 HR: Performed by: UROLOGY

## 2020-11-05 PROCEDURE — 74011250636 HC RX REV CODE- 250/636: Performed by: PHYSICIAN ASSISTANT

## 2020-11-05 PROCEDURE — 74420 UROGRAPHY RTRGR +-KUB: CPT

## 2020-11-05 DEVICE — IMPLANTABLE DEVICE: Type: IMPLANTABLE DEVICE | Site: URETER | Status: FUNCTIONAL

## 2020-11-05 RX ORDER — SODIUM CHLORIDE, SODIUM LACTATE, POTASSIUM CHLORIDE, CALCIUM CHLORIDE 600; 310; 30; 20 MG/100ML; MG/100ML; MG/100ML; MG/100ML
INJECTION, SOLUTION INTRAVENOUS
Status: DISCONTINUED | OUTPATIENT
Start: 2020-11-05 | End: 2020-11-05 | Stop reason: HOSPADM

## 2020-11-05 RX ORDER — FENTANYL CITRATE 50 UG/ML
INJECTION, SOLUTION INTRAMUSCULAR; INTRAVENOUS AS NEEDED
Status: DISCONTINUED | OUTPATIENT
Start: 2020-11-05 | End: 2020-11-05 | Stop reason: HOSPADM

## 2020-11-05 RX ORDER — PHENAZOPYRIDINE HYDROCHLORIDE 200 MG/1
200 TABLET, FILM COATED ORAL
Qty: 30 TAB | Refills: 0 | Status: SHIPPED | OUTPATIENT
Start: 2020-11-05 | End: 2020-12-11

## 2020-11-05 RX ORDER — ONDANSETRON 2 MG/ML
INJECTION INTRAMUSCULAR; INTRAVENOUS AS NEEDED
Status: DISCONTINUED | OUTPATIENT
Start: 2020-11-05 | End: 2020-11-05 | Stop reason: HOSPADM

## 2020-11-05 RX ORDER — KETOROLAC TROMETHAMINE 15 MG/ML
15 INJECTION, SOLUTION INTRAMUSCULAR; INTRAVENOUS
Status: COMPLETED | OUTPATIENT
Start: 2020-11-05 | End: 2020-11-05

## 2020-11-05 RX ORDER — MIDAZOLAM HYDROCHLORIDE 1 MG/ML
INJECTION, SOLUTION INTRAMUSCULAR; INTRAVENOUS AS NEEDED
Status: DISCONTINUED | OUTPATIENT
Start: 2020-11-05 | End: 2020-11-05 | Stop reason: HOSPADM

## 2020-11-05 RX ORDER — ONDANSETRON 2 MG/ML
4 INJECTION INTRAMUSCULAR; INTRAVENOUS
Status: DISCONTINUED | OUTPATIENT
Start: 2020-11-05 | End: 2020-11-06 | Stop reason: HOSPADM

## 2020-11-05 RX ORDER — MORPHINE SULFATE 4 MG/ML
2 INJECTION, SOLUTION INTRAMUSCULAR; INTRAVENOUS
Status: COMPLETED | OUTPATIENT
Start: 2020-11-05 | End: 2020-11-05

## 2020-11-05 RX ORDER — PROPOFOL 10 MG/ML
INJECTION, EMULSION INTRAVENOUS AS NEEDED
Status: DISCONTINUED | OUTPATIENT
Start: 2020-11-05 | End: 2020-11-05 | Stop reason: HOSPADM

## 2020-11-05 RX ORDER — LIDOCAINE HYDROCHLORIDE 20 MG/ML
INJECTION, SOLUTION EPIDURAL; INFILTRATION; INTRACAUDAL; PERINEURAL AS NEEDED
Status: DISCONTINUED | OUTPATIENT
Start: 2020-11-05 | End: 2020-11-05 | Stop reason: HOSPADM

## 2020-11-05 RX ORDER — OXYCODONE AND ACETAMINOPHEN 5; 325 MG/1; MG/1
1 TABLET ORAL
Qty: 10 TAB | Refills: 0 | Status: SHIPPED | OUTPATIENT
Start: 2020-11-05 | End: 2020-11-05

## 2020-11-05 RX ORDER — SODIUM CHLORIDE, SODIUM LACTATE, POTASSIUM CHLORIDE, CALCIUM CHLORIDE 600; 310; 30; 20 MG/100ML; MG/100ML; MG/100ML; MG/100ML
75 INJECTION, SOLUTION INTRAVENOUS CONTINUOUS
Status: DISCONTINUED | OUTPATIENT
Start: 2020-11-05 | End: 2020-11-06 | Stop reason: HOSPADM

## 2020-11-05 RX ORDER — TAMSULOSIN HYDROCHLORIDE 0.4 MG/1
0.4 CAPSULE ORAL
Qty: 90 CAP | Refills: 3 | Status: SHIPPED | OUTPATIENT
Start: 2020-11-05 | End: 2020-11-05

## 2020-11-05 RX ORDER — TAMSULOSIN HYDROCHLORIDE 0.4 MG/1
0.4 CAPSULE ORAL
Qty: 90 CAP | Refills: 3 | Status: SHIPPED | OUTPATIENT
Start: 2020-11-05

## 2020-11-05 RX ORDER — FENTANYL CITRATE 50 UG/ML
50 INJECTION, SOLUTION INTRAMUSCULAR; INTRAVENOUS
Status: DISCONTINUED | OUTPATIENT
Start: 2020-11-05 | End: 2020-11-06 | Stop reason: HOSPADM

## 2020-11-05 RX ORDER — ONDANSETRON 4 MG/1
4 TABLET, ORALLY DISINTEGRATING ORAL
Qty: 15 TAB | Refills: 0 | Status: SHIPPED | OUTPATIENT
Start: 2020-11-05 | End: 2020-12-11

## 2020-11-05 RX ORDER — PHENAZOPYRIDINE HYDROCHLORIDE 200 MG/1
200 TABLET, FILM COATED ORAL
Qty: 30 TAB | Refills: 0 | Status: SHIPPED | OUTPATIENT
Start: 2020-11-05 | End: 2020-11-05

## 2020-11-05 RX ORDER — HYDROCODONE BITARTRATE AND ACETAMINOPHEN 5; 325 MG/1; MG/1
1 TABLET ORAL
Qty: 10 TAB | Refills: 0 | Status: SHIPPED | OUTPATIENT
Start: 2020-11-05 | End: 2020-11-08

## 2020-11-05 RX ADMIN — CEFAZOLIN 3 G: 10 INJECTION, POWDER, FOR SOLUTION INTRAVENOUS at 20:28

## 2020-11-05 RX ADMIN — MORPHINE SULFATE 2 MG: 4 INJECTION, SOLUTION INTRAMUSCULAR; INTRAVENOUS at 16:48

## 2020-11-05 RX ADMIN — SODIUM CHLORIDE 1000 ML: 900 INJECTION, SOLUTION INTRAVENOUS at 15:58

## 2020-11-05 RX ADMIN — MORPHINE SULFATE 2 MG: 4 INJECTION, SOLUTION INTRAMUSCULAR; INTRAVENOUS at 15:31

## 2020-11-05 RX ADMIN — FENTANYL CITRATE 50 MCG: 50 INJECTION, SOLUTION INTRAMUSCULAR; INTRAVENOUS at 20:17

## 2020-11-05 RX ADMIN — LIDOCAINE HYDROCHLORIDE 50 MG: 20 INJECTION, SOLUTION INTRAVENOUS at 20:21

## 2020-11-05 RX ADMIN — FENTANYL CITRATE 50 MCG: 50 INJECTION, SOLUTION INTRAMUSCULAR; INTRAVENOUS at 20:30

## 2020-11-05 RX ADMIN — ONDANSETRON 4 MG: 2 SOLUTION INTRAMUSCULAR; INTRAVENOUS at 20:38

## 2020-11-05 RX ADMIN — PROPOFOL 150 MG: 10 INJECTION, EMULSION INTRAVENOUS at 20:21

## 2020-11-05 RX ADMIN — KETOROLAC TROMETHAMINE 15 MG: 15 INJECTION, SOLUTION INTRAMUSCULAR; INTRAVENOUS at 15:31

## 2020-11-05 RX ADMIN — MIDAZOLAM 2 MG: 1 INJECTION INTRAMUSCULAR; INTRAVENOUS at 20:17

## 2020-11-05 RX ADMIN — SODIUM CHLORIDE, SODIUM LACTATE, POTASSIUM CHLORIDE, AND CALCIUM CHLORIDE: 600; 310; 30; 20 INJECTION, SOLUTION INTRAVENOUS at 20:17

## 2020-11-05 NOTE — ED NOTES
Updated patient with plan of care, talked with the mother of the patient with permission from the patient.

## 2020-11-05 NOTE — ED TRIAGE NOTES
Pt arrives through triage c/o Rt sided flank pain since yesterday, has put lidocaine cream on and taken 800mg ibuprofen without relief. States he has some discomfort when urinating but no bowel symptoms. Denies fever. Does have some nausea, but no vomiting.
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
## 2020-11-05 NOTE — TELEPHONE ENCOUNTER
Attempted to contact pt at  number, no answer. Lvm for pt to return call to office at [phone] . Send a my chart message to the patient also.
It is ready for . It is belviq XR 20mg one po daily. He cannot take this at the same time as his trileptal or trilafon. He needs to give 8 hours in between. He will also need to contact his psychiatrist to see if they are okay with him taking this medication in addition to his psych meds. Checked  and no aberrancies seen.     Printed rx for:    lorcaserin (BELVIQ XR) 20 mg Tb24 [[id number]]     Order Details   Dose, Route, Frequency: As Directed    Dispense Quantity: 30 Tab Refills: 2 Fills remaining: --     
Patient is calling in to check the status of the Rx for the weight loss medication. States he was told he could pick the Rx up today.
Spoke with patient and  2 patient identifiers confirmed. Advised patient of information below. Patient understood and had no further questions.
05-Nov-2020
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
## 2020-11-06 NOTE — ANESTHESIA POSTPROCEDURE EVALUATION
Procedure(s):  CYSTOSCOPY URETERAL STENT INSERTION OR REMOVAL. general    <[email or web address]>    INITIAL Post-op Vital signs:   Vitals Value Taken Time   /79 11/5/2020  9:02 PM   Temp 36.6 °C (97.9 °F) 11/5/2020  9:02 PM   Pulse 95 11/5/2020  9:09 PM   Resp 19 11/5/2020  9:09 PM   SpO2 93 % 11/5/2020  9:09 PM   Vitals shown include unvalidated device data.

## 2020-11-06 NOTE — DISCHARGE INSTRUCTIONS
Patient Education        Cystoscopy: What to Expect at [address]     A cystoscopy is a procedure that lets a doctor look inside of the bladder and the urethra. The urethra is the tube that carries urine from the bladder to outside the body. The doctor uses a thin, lighted tool called a cystoscope. Your bladder is filled with fluid. This stretches the bladder so that your doctor can look closely at the inside of your bladder. After the cystoscopy, your urethra may be sore at first, and it may burn when you urinate for the first few days after the procedure. You may feel the need to urinate more often, and your urine may be pink. These symptoms should get better in 1 or 2 days. You will probably be able to go back to most of your usual activities in 1 or 2 days. This care sheet gives you a general idea about how long it will take for you to recover. But each person recovers at a different pace. Follow the steps below to get better as quickly as possible. How can you care for yourself at home? Activity    · Rest when you feel tired. Getting enough sleep will help you recover.     · Try to walk each day. Start by walking a little more than you did the day before. Bit by bit, increase the amount you walk. Walking boosts blood flow and helps prevent pneumonia and constipation.     · Avoid strenuous activities, such as bicycle riding, jogging, weight lifting, or aerobic exercise, until your doctor says it is okay.     · Ask your doctor when you can drive again.     · Most people are able to return to work within 1 or 2 days after the procedure.     · You may shower and take baths as usual.     · Ask your doctor when it is okay for you to have sex. Diet    · You can eat your normal diet. If your stomach is upset, try bland, low-fat foods like plain rice, broiled chicken, toast, and yogurt.     · Drink plenty of fluids (unless your doctor tells you not to).    Medicines    · Take pain medicines exactly as directed. ? If the doctor gave you a prescription medicine for pain, take it as prescribed. ? If you are not taking a prescription pain medicine, ask your doctor if you can take an over-the-counter medicine.     · If you think your pain medicine is making you sick to your stomach:  ? Take your medicine after meals (unless your doctor has told you not to). ? Ask your doctor for a different pain medicine.     · If your doctor prescribed antibiotics, take them as directed. Do not stop taking them just because you feel better. You need to take the full course of antibiotics. Follow-up care is a key part of your treatment and safety. Be sure to make and go to all appointments, and call your doctor if you are having problems. It's also a good idea to know your test results and keep a list of the medicines you take. When should you call for help? Call 911 anytime you think you may need emergency care. For example, call if:    · You passed out (lost consciousness).     · You have severe trouble breathing.     · You have sudden chest pain and shortness of breath, or you cough up blood.     · You have severe belly pain. Call your doctor now or seek immediate medical care if:    · You are sick to your stomach or cannot keep fluids down.     · Your urine is still red or you see blood clots after you have urinated several times.     · You have trouble passing urine or stool, especially if you have pain or swelling in your lower belly.     · You have signs of a blood clot, such as:  ? Pain in your calf, back of the knee, thigh, or groin. ? Redness and swelling in your leg or groin.     · You develop a fever or severe chills.     · You have pain in your back just below your rib cage. This is called flank pain. Watch closely for changes in your health, and be sure to contact your doctor if:    · You have pain or burning when you urinate.  A burning feeling is normal for a day or two after the test, but call if it does not get better.     · You have a frequent urge to urinate but can pass only small amounts of urine.     · Your urine is pink, red, or cloudy, or smells bad. It is normal for the urine to have a pinkish color for a few days after the test, but call if it does not get better. Where can you learn more? Go to http://www.gray.com/  Enter C842 in the search box to learn more about \"Cystoscopy: What to Expect at Home. \"  Current as of: June 29, 2020               Content Version: 12.6  © 2082-3898 [employer]. Care instructions adapted under license by [employer] (which disclaims liability or warranty for this information). If you have questions about a medical condition or this instruction, always ask your healthcare professional. [address] any warranty or liability for your use of this information. Patient Education        Ureteral Stent Placement: What to Expect at Home  Your Recovery     A ureteral (say \"you-REE-ter-ul\") stent is a thin, hollow tube that is placed in the ureter to help urine pass from the kidney into the bladder. Ureters are the tubes that connect the kidneys to the bladder. You may have a small amount of blood in your urine for 1 to 3 days after the procedure. While the stent is in place, you may have to urinate more often, feel a sudden need to urinate, or feel like you can't completely empty your bladder. You may feel some pain when you urinate or do strenuous activity. You also may notice a small amount of blood in your urine after strenuous activities. These side effects usually don't prevent people from doing their normal daily activities. You may have a thin string coming out of your urethra. Your urethra is the tube that carries urine from your bladder to outside your body. This string is attached to the stent. Try not to pull on the string.  The doctor will use the string to pull out the stent when you no longer need it. After the procedure, urine may flow better from your kidneys to your bladder. A ureteral stent may be left in place for several days or for as long as several months. Your doctor will take it out when you no longer need it. This care sheet gives you a general idea about how long it will take for you to recover. But each person recovers at a different pace. Follow the steps below to get better as quickly as possible. How can you care for yourself at home? Activity    · Rest when you feel tired. Getting enough sleep will help you recover.     · Avoid strenuous activities, such as bicycle riding, jogging, weight lifting, or aerobic exercise, until your doctor says it is okay.     · Ask your doctor when you can drive again.     · Most people are able to return to work the day after the procedure. If your work requires intense activity, you may feel pain in your kidney area or get tired easily. If this happens, you may need to do less strenuous activities while the stent is in.     · Ask your doctor when it is okay for you to have sex. Diet    · You can eat your normal diet. If your stomach is upset, try bland, low-fat foods like plain rice, broiled chicken, toast, and yogurt.     · Drink plenty of fluids (unless your doctor tells you not to). Medicines    · Your doctor will tell you if and when you can restart your medicines. You will also get instructions about taking any new medicines.     · If you take aspirin or some other blood thinner, ask your doctor if and when to start taking it again. Make sure that you understand exactly what your doctor wants you to do.     · Be safe with medicines. Take pain medicines exactly as directed. ? If the doctor gave you a prescription medicine for pain, take it as prescribed.   ? If you are not taking a prescription pain medicine, ask your doctor if you can take an over-the-counter medicine.     · If you think your pain medicine is making you sick to your stomach:  ? Take your medicine after meals (unless your doctor has told you not to). ? Ask your doctor for a different pain medicine.     · If your doctor prescribed antibiotics, take them as directed. Do not stop taking them just because you feel better. You need to take the full course of antibiotics. Follow-up care is a key part of your treatment and safety. Be sure to make and go to all appointments, and call your doctor if you are having problems. It's also a good idea to know your test results and keep a list of the medicines you take. When should you call for help? Call 911 anytime you think you may need emergency care. For example, call if:    · You passed out (lost consciousness).     · You have severe trouble breathing.     · You have sudden chest pain and shortness of breath, or you cough up blood.     · You have severe belly pain. Call your doctor now or seek immediate medical care if:    · Part or all of the stent comes out of your urethra.     · You have pain that does not get better after you take pain medicine.     · You have symptoms of a urinary infection. For example:  ? You have blood or pus in your urine. ? You have pain in your back just below your rib cage. This is called flank pain. ? You have a fever, chills, or body aches. ? It hurts to urinate. ? You have groin or belly pain.     · You cannot control when you urinate, or you leak urine. Watch closely for changes in your health, and be sure to contact your doctor if you have any problems. Where can you learn more? Go to http://www.gray.com/  Enter B869 in the search box to learn more about \"Ureteral Stent Placement: What to Expect at Home. \"  Current as of: June 29, 2020               Content Version: 12.6  © 7466-4358 [employer]. Care instructions adapted under license by [facility] (which disclaims liability or warranty for this information).  If you have questions about a medical condition or this instruction, always ask your healthcare professional. [place] [address] any warranty or liability for your use of this information.

## 2020-11-06 NOTE — ANESTHESIA PREPROCEDURE EVALUATION
Relevant Problems   No relevant active problems       Anesthetic History   No history of anesthetic complications            Review of Systems / Medical History  Patient summary reviewed, nursing notes reviewed and pertinent labs reviewed    Pulmonary        Sleep apnea           Neuro/Psych         Psychiatric history     Cardiovascular    Hypertension                   GI/Hepatic/Renal     GERD: well controlled    Renal disease (JADEN): stones       Endo/Other        Morbid obesity     Other Findings              Physical Exam    Airway  Mallampati: II  TM Distance: 4 - 6 cm  Neck ROM: normal range of motion   Mouth opening: Normal     Cardiovascular    Rhythm: regular           Dental    Dentition: Lower dentition intact and Upper dentition intact     Pulmonary  Breath sounds clear to auscultation               Abdominal  GI exam deferred       Other Findings            Anesthetic Plan    ASA: 3, emergent  Anesthesia type: general            Anesthetic plan and risks discussed with: Patient

## 2020-11-06 NOTE — PROGRESS NOTES
I called [employer] to check up on condition after ureteral stent placement. Stated he was doing well did notice a little discomfort in kidney area when he voids but stated \"it's not bad\". Reminded to call to make a follow up appt after we speak. Urology number given. Patient thanked me for calling and for taking care of him last night.

## 2020-11-06 NOTE — PERIOP NOTES
Pt arrives from OR. Placed on monitors. VSS, NAD. No c/o pain/discomfort. Chart, MAR and anesthesia record reviewed. Will monitor pt statis. Pt voided about 75 cc serosanguinous urine. Spoke with Sage Ca and gave her an update. 2115 transported pt to ED for discharge. VSS, NAD. Pt with no c/o pain, discomfort ATT. Pt report given to Dr Anabelle Clarke. Pt stable at time of transfer, relinquished care.

## 2020-11-06 NOTE — BRIEF OP NOTE
Brief Postoperative Note    Patient: Argelia Apodaca  YOB: 1975  MRN: [id number]    Date of Procedure: 11/5/2020     Pre-Op Diagnosis: right ureteral stone  Post-Op Diagnosis: Same as preoperative diagnosis. Procedure(s):  CYSTOSCOPY URETERAL STENT INSERTION OR REMOVAL    Surgeon(s):  Mark Jacobson MD    Surgical Assistant: None    Anesthesia: General     Estimated Blood Loss (mL): Minimal    Complications: None    Specimens: * No specimens in log *     Implants:   Implant Name Type Inv.  Item Serial No.  Lot No. LRB No. Used Action   STENT URET 6 FRX28 CM Orelia Corns NIT GUIDEWIRE INLAY OPT[employer] - [id number]  STENT URET 6 FRX28 CM Orelia Corns NIT GUIDEWIRE INLAY [employer] [id number] [facility] MEDICAL DIV_WD [id number] Right 1 Implanted       Drains: * No LDAs found *    Findings: right hydro    Electronically Signed by Jose Tipton MD on 11/5/2020 at 8:44 PM    [id number]

## 2020-11-06 NOTE — ED NOTES
Assumed care of pt at time of dc  10:04 PM  11/05/20     Discharge instructions given to pt (name) with verbalization of understanding. Patient accompanied by mother. Patient discharged with the following prescriptions sent to pharmacy. Patient discharged to home (destination).       Tiny Romero RN

## 2020-11-06 NOTE — ED NOTES
Vitals:  Patient Vitals for the past 12 hrs:   Temp Pulse Resp BP SpO2   11/05/20 2102  83 12 (!) 147/79 98 %   11/05/20 2057  87 14 (!) 142/82 98 %   11/05/20 2052  96 18 130/85 96 %   11/05/20 2047 97.3 °F (36.3 °C) (!) 103 20 129/77 90 %   11/05/20 1830    135/79 96 %   11/05/20 1815    136/83 98 %   11/05/20 1800    135/83 97 %   11/05/20 1745    132/86 97 %   11/05/20 1730    (!) 142/91 97 %   11/05/20 1715    (!) 140/78 96 %   11/05/20 1530    (!) 130/92 97 %   11/05/20 1515    122/85 97 %   11/05/20 1420 97.2 °F (36.2 °C) 90 17 (!) 151/81 96 %         Medications ordered:   Medications   lactated Ringers infusion (has no administration in time range)   ondansetron (ZOFRAN) injection 4 mg (has no administration in time range)   meperidine (pf) (DEMEROL) injection 25 mg (has no administration in time range)   fentaNYL citrate (PF) injection 50 mcg (has no administration in time range)   ketorolac (TORADOL) injection 15 mg (15 mg IntraVENous Given 11/5/20 1531)   morphine injection 2 mg (2 mg IntraVENous Given 11/5/20 1531)   sodium chloride 0.9 % bolus infusion 1,000 mL (0 mL IntraVENous IV Completed 11/5/20 1651)   morphine injection 2 mg (2 mg IntraVENous Given 11/5/20 1648)         Lab findings:  Recent Results (from the past 12 hour(s))   CBC WITH AUTOMATED DIFF    Collection Time: 11/05/20  3:05 PM   Result Value Ref Range    WBC 12.9 4.6 - 13.2 K/uL    RBC 4.90 4.70 - 5.50 M/uL    HGB 14.1 13.0 - 16.0 g/dL    HCT 41.7 36.0 - 48.0 %    MCV 85.1 74.0 - 97.0 FL    MCH 28.8 24.0 - 34.0 PG    MCHC 33.8 31.0 - 37.0 g/dL    RDW 12.6 11.6 - 14.5 %    PLATELET 205 787 - 826 K/uL    MPV 10.3 9.2 - 11.8 FL    NEUTROPHILS 79 (H) 40 - 73 %    LYMPHOCYTES 11 (L) 21 - 52 %    MONOCYTES 9 3 - 10 %    EOSINOPHILS 1 0 - 5 %    BASOPHILS 0 0 - 2 %    ABS. NEUTROPHILS 10.3 (H) 1.8 - 8.0 K/UL    ABS. LYMPHOCYTES 1.4 0.9 - 3.6 K/UL    ABS. MONOCYTES 1.1 0.05 - 1.2 K/UL    ABS.  EOSINOPHILS 0.1 0.0 - 0.4 K/UL    ABS. BASOPHILS 0.0 0.0 - 0.1 K/UL    DF AUTOMATED     METABOLIC PANEL, COMPREHENSIVE    Collection Time: 11/05/20  3:05 PM   Result Value Ref Range    Sodium 139 136 - 145 mmol/L    Potassium 3.9 3.5 - 5.5 mmol/L    Chloride 104 100 - 111 mmol/L    CO2 26 21 - 32 mmol/L    Anion gap 9 3.0 - 18 mmol/L    Glucose 118 (H) 74 - 99 mg/dL    BUN 11 7.0 - 18 MG/DL    Creatinine 1.67 (H) 0.6 - 1.3 MG/DL    BUN/Creatinine ratio 7 (L) 12 - 20      GFR est AA 55 (L) >60 ml/min/1.73m2    GFR est non-AA 45 (L) >60 ml/min/1.73m2    Calcium 9.7 8.5 - 10.1 MG/DL    Bilirubin, total 0.7 0.2 - 1.0 MG/DL    ALT (SGPT) 37 16 - 61 U/L    AST (SGOT) 15 10 - 38 U/L    Alk. phosphatase 116 45 - 117 U/L    Protein, total 7.4 6.4 - 8.2 g/dL    Albumin 4.4 3.4 - 5.0 g/dL    Globulin 3.0 2.0 - 4.0 g/dL    A-G Ratio 1.5 0.8 - 1.7     URINALYSIS W/ RFLX MICROSCOPIC    Collection Time: 11/05/20  3:05 PM   Result Value Ref Range    Color YELLOW      Appearance CLEAR      Specific gravity 1.022 1.005 - 1.030      pH (UA) 6.5 5.0 - 8.0      Protein Negative NEG mg/dL    Glucose Negative NEG mg/dL    Ketone TRACE (A) NEG mg/dL    Bilirubin Negative NEG      Blood Negative NEG      Urobilinogen 1.0 0.2 - 1.0 EU/dL    Nitrites Negative NEG      Leukocyte Esterase Negative NEG     SARS-COV-2    Collection Time: 11/05/20  4:30 PM   Result Value Ref Range    Specimen source NASAL SWAB      COVID-19 rapid test Not detected NOTD      Specimen type NP Swab      Health status NP Swab         EKG interpretation by ED Physician:      X-Ray, CT or other radiology findings or impressions:  CT ABD PELV WO CONT   Final Result   IMPRESSION:         1. Severe acute right-sided obstructive uropathy secondary to 6 mm proximal   right ureteral calculus. 2. Splenomegaly. 3. Severe hepatic steatosis. Small fat-containing umbilical hernia.       NC XR TECHNOLOGIST SERVICE    (Results Pending)   XR RETROGRADE PYELOGRAM    (Results Pending) Progress notes, Consult notes or additional Procedure notes:   Turned over to me to follow-up after stent placement. Patient had prescriptions placed by the urologist but they were sent to the pharmacy in [place]. I rerouted the prescription to the local pharmacy. Patient has good pain control. He has urinated without difficulty. I have discussed with patient and/or family/sig other the results, interpretation of any imaging if performed, suspected diagnosis and treatment plan to include instructions regarding the diagnoses listed to which understanding was expressed with all questions answered      Reevaluation of patient:   stable    Disposition:  Diagnosis:   1. Hydronephrosis with urinary obstruction due to ureteral calculus        Disposition: home    Follow-up Information     Follow up With Specialties Details Why Contact Info    Mebrahtu, Cynthia Ahumada, MD Internal Medicine Call As needed [address] [place] [address]      Salma Noe MD Urology Schedule an appointment as soon as possible for a visit call office in the morning [address] EMERGENCY DEPT Emergency Medicine  If symptoms worsen [address] [facility]  [phone]            Patient's Medications   Start Taking    ONDANSETRON (ZOFRAN ODT) 4 MG DISINTEGRATING TABLET    Take 1 Tab by mouth every eight (8) hours as needed for Nausea or Vomiting. OXYCODONE-ACETAMINOPHEN (PERCOCET) 5-325 MG PER TABLET    Take 1 Tab by mouth every six (6) hours as needed for Pain for up to 3 days. Max Daily Amount: 4 Tabs. PHENAZOPYRIDINE (PYRIDIUM) 200 MG TABLET    Take 1 Tab by mouth three (3) times daily as needed for Pain for up to 30 doses. TAMSULOSIN (FLOMAX) 0.4 MG CAPSULE    Take 1 Cap by mouth daily (after dinner). Continue Taking    AMLODIPINE (NORVASC) 5 MG TABLET    Take 1 Tab by mouth daily.     CHOLECALCIFEROL, VITAMIN D3, 10,000 UNIT CAP    Take 1 Cap by mouth daily. DIAZEPAM (VALIUM) 5 MG TABLET    Take 5 mg by mouth three (3) times daily. FENOFIBRATE NANOCRYSTALLIZED (TRICOR) 48 MG TABLET    take 1 tablet by mouth once daily    FLINTSTONES MULTIVITAMIN PO    Take 1 Tab by mouth daily. LORATADINE (CLARITIN) 10 MG TABLET    take 1 tablet by mouth once daily    LORCASERIN (BELVIQ XR) 20 MG TB24    Take one po daily    LOSARTAN (COZAAR) 100 MG TABLET    take 1 tablet by mouth once daily    LOSARTAN (COZAAR) 100 MG TABLET    take 1 tablet by mouth once daily    MELOXICAM (MOBIC) 7.5 MG TABLET    Take 1-2 po daily prn pain    OMEPRAZOLE (PRILOSEC) 40 MG CAPSULE    take 1 capsule by mouth once daily    OTHER    Fish oil 2400mg a daily liquid    OXCARBAZEPINE (TRILEPTAL) 600 MG TABLET    take 1 tablet by mouth twice a day    PERPHENAZINE (TRILAFON) 16 MG TABLET    Take 1 Tab by mouth three (3) times daily.    These Medications have changed    No medications on file   Stop Taking    No medications on file

## 2020-11-17 NOTE — OP NOTES
[facility]  OPERATIVE REPORT    Name:  Padmini Rojas  MR#:   [id number]  :  1975  ACCOUNT #:  [de-identified]  DATE OF SERVICE:  2020    PREOPERATIVE DIAGNOSIS:  Right ureteral stone. POSTOPERATIVE DIAGNOSIS:  Right ureteral stone. PROCEDURE PERFORMED:  Cystoscopy, right retrograde, right double-J stent. SURGEON:  Osmani Hopkins MD    ASSISTANT:  none    ANESTHESIA:  General.    COMPLICATIONS:  None. SPECIMENS REMOVED:  None. IMPLANTS:  6x28 jj    ESTIMATED BLOOD LOSS:  None. DRAINS:  6 x 28 double-J stent. FINDINGS:  Right hydronephrosis. DISPOSITION:  The patient tolerated the procedure well and went to the recovery room in stable condition. INDICATIONS FOR SURGERY:  Please see urologic H and P. PROCEDURE:  The patient was correctly identified, brought in the operating room, placed in the supine position, given general anesthesia, moved down on the operating table, placed in the dorsal lithotomy position. His perineum, penis, and lower abdomen were prepped and draped. Time-out was performed and we all agreed. Prior to the induction of anesthesia, IV antibiotics and Chace stockings were placed. A 21-Malawian cystoscope sheath was used. Anterior urethra appeared normal.  Prostatic urethra was opened. Bladder was inspected. There were no stones, tumors, or lesions. A 0.38 Glidewire was passed up the right ureteral orifice. Over this, a 5-Malawian open-ended ureteral catheter was passed, and retrograde pyelogram was performed under fluoroscopy. There was right hydronephrosis. Glidewire was then re-passed into the patient's renal pelvis with fluoroscopy, and over the Glidewire, a double-J stent was passed, this was a 6 x 28. There was a good curl seen in the patient's renal pelvis and a good curl in the patient's bladder. His bladder was drained. He was awakened from anesthesia and taken to the recovery room in stable condition.       [place], MD [email or web address]  D:  11/16/2020 19:48  T:  11/17/2020 0:20  JOB #:  [id number]